# Patient Record
Sex: FEMALE | Race: WHITE | NOT HISPANIC OR LATINO | Employment: OTHER | RURAL
[De-identification: names, ages, dates, MRNs, and addresses within clinical notes are randomized per-mention and may not be internally consistent; named-entity substitution may affect disease eponyms.]

---

## 2021-02-23 ENCOUNTER — HISTORICAL (OUTPATIENT)
Dept: ADMINISTRATIVE | Facility: HOSPITAL | Age: 56
End: 2021-02-23

## 2021-04-20 ENCOUNTER — TELEPHONE (OUTPATIENT)
Dept: FAMILY MEDICINE | Facility: CLINIC | Age: 56
End: 2021-04-20

## 2021-04-20 RX ORDER — VALSARTAN 160 MG/1
160 TABLET ORAL DAILY
Qty: 90 TABLET | Refills: 0 | Status: SHIPPED | OUTPATIENT
Start: 2021-04-20 | End: 2021-07-13 | Stop reason: SDUPTHER

## 2021-04-20 RX ORDER — AMLODIPINE, VALSARTAN AND HYDROCHLOROTHIAZIDE 10; 160; 25 MG/1; MG/1; MG/1
1 TABLET ORAL DAILY
Qty: 90 TABLET | Refills: 1 | Status: CANCELLED | OUTPATIENT
Start: 2021-04-20

## 2021-04-20 RX ORDER — AMLODIPINE BESYLATE 10 MG/1
10 TABLET ORAL DAILY
COMMUNITY
End: 2021-04-20 | Stop reason: SDUPTHER

## 2021-04-20 RX ORDER — VALSARTAN 160 MG/1
160 TABLET ORAL DAILY
COMMUNITY
End: 2021-04-20 | Stop reason: SDUPTHER

## 2021-04-20 RX ORDER — HYDROCHLOROTHIAZIDE 25 MG/1
25 TABLET ORAL DAILY
Qty: 90 TABLET | Refills: 0 | Status: SHIPPED | OUTPATIENT
Start: 2021-04-20 | End: 2021-07-13 | Stop reason: SDUPTHER

## 2021-04-20 RX ORDER — HYDROCHLOROTHIAZIDE 25 MG/1
25 TABLET ORAL DAILY
COMMUNITY
End: 2021-04-20 | Stop reason: SDUPTHER

## 2021-04-20 RX ORDER — AMLODIPINE BESYLATE 10 MG/1
10 TABLET ORAL DAILY
Qty: 90 TABLET | Refills: 0 | Status: SHIPPED | OUTPATIENT
Start: 2021-04-20 | End: 2021-07-13 | Stop reason: SDUPTHER

## 2021-04-20 RX ORDER — AMLODIPINE, VALSARTAN AND HYDROCHLOROTHIAZIDE 10; 160; 25 MG/1; MG/1; MG/1
1 TABLET ORAL DAILY
COMMUNITY
Start: 2021-02-12 | End: 2021-04-20 | Stop reason: ALTCHOICE

## 2021-04-27 ENCOUNTER — OFFICE VISIT (OUTPATIENT)
Dept: FAMILY MEDICINE | Facility: CLINIC | Age: 56
End: 2021-04-27
Payer: OTHER GOVERNMENT

## 2021-04-27 VITALS
DIASTOLIC BLOOD PRESSURE: 88 MMHG | TEMPERATURE: 99 F | HEART RATE: 87 BPM | RESPIRATION RATE: 18 BRPM | SYSTOLIC BLOOD PRESSURE: 156 MMHG | HEIGHT: 63 IN | WEIGHT: 144 LBS | OXYGEN SATURATION: 99 % | BODY MASS INDEX: 25.52 KG/M2

## 2021-04-27 DIAGNOSIS — E78.5 HYPERLIPIDEMIA, UNSPECIFIED HYPERLIPIDEMIA TYPE: Primary | ICD-10-CM

## 2021-04-27 DIAGNOSIS — I10 ESSENTIAL HYPERTENSION: ICD-10-CM

## 2021-04-27 DIAGNOSIS — E87.1 HYPONATREMIA: ICD-10-CM

## 2021-04-27 DIAGNOSIS — F17.200 SMOKER: ICD-10-CM

## 2021-04-27 PROCEDURE — 99212 OFFICE O/P EST SF 10 MIN: CPT | Mod: ,,, | Performed by: NURSE PRACTITIONER

## 2021-04-27 PROCEDURE — 99212 PR OFFICE/OUTPT VISIT, EST, LEVL II, 10-19 MIN: ICD-10-PCS | Mod: ,,, | Performed by: NURSE PRACTITIONER

## 2021-07-13 ENCOUNTER — OFFICE VISIT (OUTPATIENT)
Dept: FAMILY MEDICINE | Facility: CLINIC | Age: 56
End: 2021-07-13
Payer: OTHER GOVERNMENT

## 2021-07-13 VITALS
SYSTOLIC BLOOD PRESSURE: 158 MMHG | WEIGHT: 137 LBS | BODY MASS INDEX: 24.27 KG/M2 | DIASTOLIC BLOOD PRESSURE: 78 MMHG | OXYGEN SATURATION: 97 % | HEART RATE: 81 BPM | HEIGHT: 63 IN | RESPIRATION RATE: 20 BRPM | TEMPERATURE: 98 F

## 2021-07-13 DIAGNOSIS — E78.5 DYSLIPIDEMIA: ICD-10-CM

## 2021-07-13 DIAGNOSIS — F17.200 SMOKER: ICD-10-CM

## 2021-07-13 DIAGNOSIS — I10 HYPERTENSION, UNSPECIFIED TYPE: Primary | ICD-10-CM

## 2021-07-13 DIAGNOSIS — E87.1 HYPONATREMIA: ICD-10-CM

## 2021-07-13 PROCEDURE — 99214 PR OFFICE/OUTPT VISIT, EST, LEVL IV, 30-39 MIN: ICD-10-PCS | Mod: ,,, | Performed by: NURSE PRACTITIONER

## 2021-07-13 PROCEDURE — 99214 OFFICE O/P EST MOD 30 MIN: CPT | Mod: ,,, | Performed by: NURSE PRACTITIONER

## 2021-07-13 RX ORDER — VALSARTAN 160 MG/1
160 TABLET ORAL DAILY
Qty: 90 TABLET | Refills: 1 | Status: SHIPPED | OUTPATIENT
Start: 2021-07-13 | End: 2022-01-13 | Stop reason: SDUPTHER

## 2021-07-13 RX ORDER — HYDROCHLOROTHIAZIDE 25 MG/1
25 TABLET ORAL DAILY
Qty: 90 TABLET | Refills: 1 | Status: SHIPPED | OUTPATIENT
Start: 2021-07-13 | End: 2022-01-13 | Stop reason: SDUPTHER

## 2021-07-13 RX ORDER — AMLODIPINE BESYLATE 10 MG/1
10 TABLET ORAL DAILY
Qty: 90 TABLET | Refills: 1 | Status: SHIPPED | OUTPATIENT
Start: 2021-07-13 | End: 2022-01-13 | Stop reason: SDUPTHER

## 2021-08-09 ENCOUNTER — TELEPHONE (OUTPATIENT)
Dept: FAMILY MEDICINE | Facility: CLINIC | Age: 56
End: 2021-08-09

## 2022-01-13 ENCOUNTER — OFFICE VISIT (OUTPATIENT)
Dept: FAMILY MEDICINE | Facility: CLINIC | Age: 57
End: 2022-01-13
Payer: OTHER GOVERNMENT

## 2022-01-13 VITALS
RESPIRATION RATE: 18 BRPM | HEIGHT: 63 IN | HEART RATE: 85 BPM | OXYGEN SATURATION: 97 % | WEIGHT: 135 LBS | TEMPERATURE: 98 F | BODY MASS INDEX: 23.92 KG/M2

## 2022-01-13 DIAGNOSIS — E87.1 HYPONATREMIA: ICD-10-CM

## 2022-01-13 DIAGNOSIS — R05.9 COUGH: ICD-10-CM

## 2022-01-13 DIAGNOSIS — J06.9 UPPER RESPIRATORY TRACT INFECTION, UNSPECIFIED TYPE: ICD-10-CM

## 2022-01-13 DIAGNOSIS — I10 HYPERTENSION, UNSPECIFIED TYPE: Primary | ICD-10-CM

## 2022-01-13 DIAGNOSIS — E78.5 DYSLIPIDEMIA: ICD-10-CM

## 2022-01-13 LAB
CTP QC/QA: YES
FLUAV AG NPH QL: NEGATIVE
FLUBV AG NPH QL: NEGATIVE
SARS-COV-2 AG RESP QL IA.RAPID: NEGATIVE

## 2022-01-13 PROCEDURE — 87428 POCT SARS-COV2 (COVID) WITH FLU ANTIGEN: ICD-10-PCS | Mod: QW,,, | Performed by: NURSE PRACTITIONER

## 2022-01-13 PROCEDURE — 87428 SARSCOV & INF VIR A&B AG IA: CPT | Mod: QW,,, | Performed by: NURSE PRACTITIONER

## 2022-01-13 PROCEDURE — 99213 OFFICE O/P EST LOW 20 MIN: CPT | Mod: ,,, | Performed by: NURSE PRACTITIONER

## 2022-01-13 PROCEDURE — 99213 PR OFFICE/OUTPT VISIT, EST, LEVL III, 20-29 MIN: ICD-10-PCS | Mod: ,,, | Performed by: NURSE PRACTITIONER

## 2022-01-13 RX ORDER — CEFUROXIME AXETIL 500 MG/1
500 TABLET ORAL EVERY 12 HOURS
Qty: 20 TABLET | Refills: 0 | Status: SHIPPED | OUTPATIENT
Start: 2022-01-13 | End: 2022-01-23

## 2022-01-13 RX ORDER — HYDROCHLOROTHIAZIDE 25 MG/1
25 TABLET ORAL DAILY
Qty: 90 TABLET | Refills: 1 | Status: SHIPPED | OUTPATIENT
Start: 2022-01-13 | End: 2022-06-02 | Stop reason: SDUPTHER

## 2022-01-13 RX ORDER — MUPIROCIN 20 MG/G
OINTMENT TOPICAL 3 TIMES DAILY
Qty: 15 G | Refills: 1 | Status: SHIPPED | OUTPATIENT
Start: 2022-01-13 | End: 2022-01-18

## 2022-01-13 RX ORDER — AMLODIPINE BESYLATE 10 MG/1
10 TABLET ORAL DAILY
Qty: 90 TABLET | Refills: 1 | Status: SHIPPED | OUTPATIENT
Start: 2022-01-13 | End: 2022-06-02 | Stop reason: SDUPTHER

## 2022-01-13 RX ORDER — VALSARTAN 160 MG/1
160 TABLET ORAL DAILY
Qty: 90 TABLET | Refills: 1 | Status: SHIPPED | OUTPATIENT
Start: 2022-01-13 | End: 2022-06-02 | Stop reason: SDUPTHER

## 2022-01-13 NOTE — PROGRESS NOTES
Subjective:       Patient ID: Rosetta Simental is a 57 y.o. female.    Chief Complaint: Cough, Medication Refill, Chills, Diarrhea, fever blisters (nose), and Fatigue (S/s x 1 week/)    Ms. Simental is an established patient who presents in follow up for hypertension, she reports she is getting over a recent illness with symptoms of nasal drainage and congestion, post nasal drip, dry cough, diarrhea, fever chills - she reports fever has resolved and she had multiple children visiting over the holiday. All symptoms have resolved except for copious post nasal drip and tenderness over the sinuses - she developed sores inside her nose and above her upper lip that have not improved with abreva, she thought they were fever blisters. She states she has had fever blisters on her mouth before.    Review of Systems   Constitutional: Negative for chills and fever.   HENT: Positive for nasal congestion, postnasal drip, rhinorrhea and sinus pressure/congestion. Negative for sore throat and trouble swallowing.    Respiratory: Negative for cough, chest tightness, shortness of breath and wheezing.    Cardiovascular: Negative for chest pain and palpitations.   Gastrointestinal: Positive for diarrhea. Negative for abdominal pain, change in bowel habit and change in bowel habit.   Genitourinary: Negative.    Musculoskeletal: Negative.    Integumentary:  Positive for mole/lesion (inside nose and above lips). Negative for rash.   Neurological: Negative for dizziness, weakness and headaches.   Psychiatric/Behavioral: Negative.          Objective:      Physical Exam  Vitals and nursing note reviewed.   Constitutional:       Appearance: Normal appearance.   HENT:      Head: Normocephalic.      Nose:     Cardiovascular:      Heart sounds: Normal heart sounds.   Pulmonary:      Effort: Pulmonary effort is normal.      Breath sounds: Normal breath sounds.   Abdominal:      General: Bowel sounds are normal.      Palpations: Abdomen is soft.    Musculoskeletal:         General: Normal range of motion.   Skin:     General: Skin is warm and dry.   Neurological:      Mental Status: She is alert and oriented to person, place, and time.   Psychiatric:         Behavior: Behavior normal.         Assessment:       Problem List Items Addressed This Visit    None     Visit Diagnoses     Hypertension, unspecified type    -  Primary    Relevant Medications    valsartan (DIOVAN) 160 MG tablet    hydroCHLOROthiazide (HYDRODIURIL) 25 MG tablet    amLODIPine (NORVASC) 10 MG tablet    Cough        Relevant Orders    POCT SARS-COV2 (COVID) with Flu Antigen (Completed)    Upper respiratory tract infection, unspecified type              Plan:        1. Hypertension - RF meds and continue.   2. Covid and Flu negative, start Doxycycline 100mg BID X 10 days for secondary URI, cont flonase, mupirocin for nasal sores.    3. RTC for fasting labs in 1-2 weeks. Cholesterol and CMP need repeat.

## 2022-03-07 ENCOUNTER — HOSPITAL ENCOUNTER (OUTPATIENT)
Dept: RADIOLOGY | Facility: HOSPITAL | Age: 57
Discharge: HOME | End: 2022-03-07
Payer: OTHER GOVERNMENT

## 2022-03-07 DIAGNOSIS — Z12.31 VISIT FOR SCREENING MAMMOGRAM: ICD-10-CM

## 2022-03-07 PROCEDURE — 77067 SCR MAMMO BI INCL CAD: CPT | Mod: TC

## 2022-06-02 ENCOUNTER — OFFICE VISIT (OUTPATIENT)
Dept: FAMILY MEDICINE | Facility: CLINIC | Age: 57
End: 2022-06-02
Payer: OTHER GOVERNMENT

## 2022-06-02 VITALS
HEART RATE: 79 BPM | DIASTOLIC BLOOD PRESSURE: 68 MMHG | SYSTOLIC BLOOD PRESSURE: 150 MMHG | BODY MASS INDEX: 24.27 KG/M2 | WEIGHT: 137 LBS | HEIGHT: 63 IN | RESPIRATION RATE: 17 BRPM | OXYGEN SATURATION: 99 % | TEMPERATURE: 98 F

## 2022-06-02 DIAGNOSIS — F17.200 SMOKER: ICD-10-CM

## 2022-06-02 DIAGNOSIS — E78.5 DYSLIPIDEMIA: ICD-10-CM

## 2022-06-02 DIAGNOSIS — Z12.4 SCREENING FOR MALIGNANT NEOPLASM OF CERVIX: ICD-10-CM

## 2022-06-02 DIAGNOSIS — F41.9 ANXIETY: ICD-10-CM

## 2022-06-02 DIAGNOSIS — I10 HYPERTENSION, UNSPECIFIED TYPE: Primary | ICD-10-CM

## 2022-06-02 LAB
ALBUMIN SERPL BCP-MCNC: 4.6 G/DL (ref 3.5–5)
ALBUMIN/GLOB SERPL: 1.5 {RATIO}
ALP SERPL-CCNC: 73 U/L (ref 46–118)
ALT SERPL W P-5'-P-CCNC: 58 U/L (ref 13–56)
ANION GAP SERPL CALCULATED.3IONS-SCNC: 19 MMOL/L (ref 7–16)
AST SERPL W P-5'-P-CCNC: 43 U/L (ref 15–37)
BASOPHILS # BLD AUTO: 0.06 K/UL (ref 0–0.2)
BASOPHILS NFR BLD AUTO: 0.6 % (ref 0–1)
BILIRUB SERPL-MCNC: 0.8 MG/DL (ref 0–1.2)
BUN SERPL-MCNC: 11 MG/DL (ref 7–18)
BUN/CREAT SERPL: 16 (ref 6–20)
CALCIUM SERPL-MCNC: 9.7 MG/DL (ref 8.5–10.1)
CHLORIDE SERPL-SCNC: 82 MMOL/L (ref 98–107)
CO2 SERPL-SCNC: 22 MMOL/L (ref 21–32)
CREAT SERPL-MCNC: 0.68 MG/DL (ref 0.55–1.02)
DIFFERENTIAL METHOD BLD: ABNORMAL
EOSINOPHIL # BLD AUTO: 0.02 K/UL (ref 0–0.5)
EOSINOPHIL NFR BLD AUTO: 0.2 % (ref 1–4)
ERYTHROCYTE [DISTWIDTH] IN BLOOD BY AUTOMATED COUNT: 13 % (ref 11.5–14.5)
GLOBULIN SER-MCNC: 3.1 G/DL (ref 2–4)
GLUCOSE SERPL-MCNC: 91 MG/DL (ref 74–106)
HCT VFR BLD AUTO: 34.9 % (ref 38–47)
HGB BLD-MCNC: 12.9 G/DL (ref 12–16)
IMM GRANULOCYTES # BLD AUTO: 0.06 K/UL (ref 0–0.04)
IMM GRANULOCYTES NFR BLD: 0.6 % (ref 0–0.4)
LYMPHOCYTES # BLD AUTO: 1.22 K/UL (ref 1–4.8)
LYMPHOCYTES NFR BLD AUTO: 13.1 % (ref 27–41)
MCH RBC QN AUTO: 35.7 PG (ref 27–31)
MCHC RBC AUTO-ENTMCNC: 37 G/DL (ref 32–36)
MCV RBC AUTO: 96.7 FL (ref 80–96)
MONOCYTES # BLD AUTO: 1.06 K/UL (ref 0–0.8)
MONOCYTES NFR BLD AUTO: 11.4 % (ref 2–6)
MPC BLD CALC-MCNC: 9.2 FL (ref 9.4–12.4)
NEUTROPHILS # BLD AUTO: 6.86 K/UL (ref 1.8–7.7)
NEUTROPHILS NFR BLD AUTO: 74.1 % (ref 53–65)
NRBC # BLD AUTO: 0 X10E3/UL
NRBC, AUTO (.00): 0 %
PLATELET # BLD AUTO: 368 K/UL (ref 150–400)
POTASSIUM SERPL-SCNC: 4.3 MMOL/L (ref 3.5–5.1)
PROT SERPL-MCNC: 7.7 G/DL (ref 6.4–8.2)
RBC # BLD AUTO: 3.61 M/UL (ref 4.2–5.4)
SODIUM SERPL-SCNC: 119 MMOL/L (ref 136–145)
WBC # BLD AUTO: 9.28 K/UL (ref 4.5–11)

## 2022-06-02 PROCEDURE — 99213 PR OFFICE/OUTPT VISIT, EST, LEVL III, 20-29 MIN: ICD-10-PCS | Mod: ,,, | Performed by: NURSE PRACTITIONER

## 2022-06-02 PROCEDURE — 85025 COMPLETE CBC W/AUTO DIFF WBC: CPT | Mod: ,,, | Performed by: CLINICAL MEDICAL LABORATORY

## 2022-06-02 PROCEDURE — 80053 COMPREHEN METABOLIC PANEL: CPT | Mod: ,,, | Performed by: CLINICAL MEDICAL LABORATORY

## 2022-06-02 PROCEDURE — 80053 COMPREHENSIVE METABOLIC PANEL: ICD-10-PCS | Mod: ,,, | Performed by: CLINICAL MEDICAL LABORATORY

## 2022-06-02 PROCEDURE — 85025 CBC WITH DIFFERENTIAL: ICD-10-PCS | Mod: ,,, | Performed by: CLINICAL MEDICAL LABORATORY

## 2022-06-02 PROCEDURE — 99213 OFFICE O/P EST LOW 20 MIN: CPT | Mod: ,,, | Performed by: NURSE PRACTITIONER

## 2022-06-02 RX ORDER — AMLODIPINE BESYLATE 10 MG/1
10 TABLET ORAL DAILY
Qty: 90 TABLET | Refills: 1 | Status: SHIPPED | OUTPATIENT
Start: 2022-06-02 | End: 2022-12-06 | Stop reason: SDUPTHER

## 2022-06-02 RX ORDER — VALSARTAN 160 MG/1
160 TABLET ORAL DAILY
Qty: 90 TABLET | Refills: 1 | Status: SHIPPED | OUTPATIENT
Start: 2022-06-02 | End: 2022-12-06 | Stop reason: SDUPTHER

## 2022-06-02 RX ORDER — BUSPIRONE HYDROCHLORIDE 10 MG/1
10 TABLET ORAL 3 TIMES DAILY
Qty: 90 TABLET | Refills: 1 | Status: SHIPPED | OUTPATIENT
Start: 2022-06-02 | End: 2022-12-06 | Stop reason: SDUPTHER

## 2022-06-02 RX ORDER — HYDROCHLOROTHIAZIDE 25 MG/1
25 TABLET ORAL DAILY
Qty: 90 TABLET | Refills: 1 | Status: SHIPPED | OUTPATIENT
Start: 2022-06-02 | End: 2022-12-06 | Stop reason: HOSPADM

## 2022-06-02 NOTE — PROGRESS NOTES
Subjective:       Patient ID: Rosetta Simental is a 56 y.o. female.    Chief Complaint: Medication Refill (Reports she is nonfasting), Anxiety (Gittery x 1 week, reports no pleasure in doing the things she enjoys, PHQ4 8), and Depression (PHQ9 10)    Ms. Simental presents to clinic in follow up for hypertension, blood pressure is elevated today. She reports worsening anxiety over the last few weeks due to increased personal stressors, she notes that her son in law is undergoing treatment for a malignant brain tumor.    Review of Systems   Constitutional: Negative.    Respiratory: Negative.    Cardiovascular: Negative.    Gastrointestinal: Negative.    Genitourinary: Negative.    Neurological: Negative.    Psychiatric/Behavioral: The patient is nervous/anxious.          Objective:      Physical Exam  Vitals and nursing note reviewed.   Constitutional:       Appearance: Normal appearance.   HENT:      Head: Normocephalic.   Cardiovascular:      Rate and Rhythm: Normal rate and regular rhythm.      Pulses: Normal pulses.      Heart sounds: Normal heart sounds.   Pulmonary:      Breath sounds: Wheezing (slight exp wheeze to right base) present.   Abdominal:      General: Bowel sounds are normal.      Palpations: Abdomen is soft.   Musculoskeletal:      Cervical back: Normal range of motion.   Skin:     General: Skin is warm and dry.   Neurological:      Mental Status: She is alert and oriented to person, place, and time.   Psychiatric:         Behavior: Behavior normal.         Assessment:       Problem List Items Addressed This Visit        Other    Smoker      Other Visit Diagnoses     Hypertension, unspecified type    -  Primary    Relevant Medications    valsartan (DIOVAN) 160 MG tablet    hydroCHLOROthiazide (HYDRODIURIL) 25 MG tablet    amLODIPine (NORVASC) 10 MG tablet    Other Relevant Orders    CBC Auto Differential    Comprehensive Metabolic Panel    Screening for malignant neoplasm of cervix        Relevant  Orders    Ambulatory referral/consult to Obstetrics / Gynecology    Anxiety        Dyslipidemia              Plan:        Anxiety - trial of buspar 10mg TID As needed for anxiety.   medication refills, nonfasting labs today.

## 2022-06-15 ENCOUNTER — TELEPHONE (OUTPATIENT)
Dept: FAMILY MEDICINE | Facility: CLINIC | Age: 57
End: 2022-06-15
Payer: OTHER GOVERNMENT

## 2022-06-15 NOTE — TELEPHONE ENCOUNTER
Pt attempted several times over the last week, voicemail has been left at each attempt, no answer. Karishma to be notified.

## 2022-06-17 ENCOUNTER — TELEPHONE (OUTPATIENT)
Dept: FAMILY MEDICINE | Facility: CLINIC | Age: 57
End: 2022-06-17
Payer: OTHER GOVERNMENT

## 2022-06-17 NOTE — TELEPHONE ENCOUNTER
----- Message from VAISHALI Man sent at 6/9/2022  4:49 PM CDT -----  Needs appt, reduce HCTZ to 1/2 tablet (taking 25mg) - recheck BP and lab due to low sodium level. This is urgent.

## 2022-12-06 ENCOUNTER — OFFICE VISIT (OUTPATIENT)
Dept: FAMILY MEDICINE | Facility: CLINIC | Age: 57
End: 2022-12-06
Payer: OTHER GOVERNMENT

## 2022-12-06 VITALS
RESPIRATION RATE: 18 BRPM | SYSTOLIC BLOOD PRESSURE: 138 MMHG | DIASTOLIC BLOOD PRESSURE: 80 MMHG | BODY MASS INDEX: 24.27 KG/M2 | HEIGHT: 63 IN | TEMPERATURE: 98 F | HEART RATE: 101 BPM | OXYGEN SATURATION: 96 % | WEIGHT: 137 LBS

## 2022-12-06 DIAGNOSIS — F17.200 SMOKER: ICD-10-CM

## 2022-12-06 DIAGNOSIS — E78.5 DYSLIPIDEMIA: ICD-10-CM

## 2022-12-06 DIAGNOSIS — E87.1 HYPONATREMIA: ICD-10-CM

## 2022-12-06 DIAGNOSIS — I10 PRIMARY HYPERTENSION: ICD-10-CM

## 2022-12-06 DIAGNOSIS — I10 ESSENTIAL HYPERTENSION: Primary | ICD-10-CM

## 2022-12-06 LAB
ALBUMIN SERPL BCP-MCNC: 3.9 G/DL (ref 3.5–5)
ALBUMIN/GLOB SERPL: 1 {RATIO}
ALP SERPL-CCNC: 66 U/L (ref 46–118)
ALT SERPL W P-5'-P-CCNC: 21 U/L (ref 13–56)
ANION GAP SERPL CALCULATED.3IONS-SCNC: 11 MMOL/L (ref 7–16)
AST SERPL W P-5'-P-CCNC: 19 U/L (ref 15–37)
BASOPHILS # BLD AUTO: 0.1 K/UL (ref 0–0.2)
BASOPHILS NFR BLD AUTO: 1.4 % (ref 0–1)
BILIRUB SERPL-MCNC: 0.3 MG/DL (ref ?–1.2)
BUN SERPL-MCNC: 11 MG/DL (ref 7–18)
BUN/CREAT SERPL: 14 (ref 6–20)
CALCIUM SERPL-MCNC: 9.7 MG/DL (ref 8.5–10.1)
CHLORIDE SERPL-SCNC: 97 MMOL/L (ref 98–107)
CHOLEST SERPL-MCNC: 241 MG/DL (ref 0–200)
CHOLEST/HDLC SERPL: 2.6 {RATIO}
CO2 SERPL-SCNC: 28 MMOL/L (ref 21–32)
CREAT SERPL-MCNC: 0.77 MG/DL (ref 0.55–1.02)
DIFFERENTIAL METHOD BLD: ABNORMAL
EGFR (NO RACE VARIABLE) (RUSH/TITUS): 91 ML/MIN/1.73M²
EOSINOPHIL # BLD AUTO: 0.1 K/UL (ref 0–0.5)
EOSINOPHIL NFR BLD AUTO: 1.4 % (ref 1–4)
ERYTHROCYTE [DISTWIDTH] IN BLOOD BY AUTOMATED COUNT: 13.9 % (ref 11.5–14.5)
GLOBULIN SER-MCNC: 3.9 G/DL (ref 2–4)
GLUCOSE SERPL-MCNC: 98 MG/DL (ref 74–106)
HCT VFR BLD AUTO: 37.4 % (ref 38–47)
HDLC SERPL-MCNC: 94 MG/DL (ref 40–60)
HGB BLD-MCNC: 13.2 G/DL (ref 12–16)
IMM GRANULOCYTES # BLD AUTO: 0.14 K/UL (ref 0–0.04)
IMM GRANULOCYTES NFR BLD: 2 % (ref 0–0.4)
LDLC SERPL CALC-MCNC: 122 MG/DL
LDLC/HDLC SERPL: 1.3 {RATIO}
LYMPHOCYTES # BLD AUTO: 1.73 K/UL (ref 1–4.8)
LYMPHOCYTES NFR BLD AUTO: 24.3 % (ref 27–41)
MCH RBC QN AUTO: 34.9 PG (ref 27–31)
MCHC RBC AUTO-ENTMCNC: 35.3 G/DL (ref 32–36)
MCV RBC AUTO: 98.9 FL (ref 80–96)
MONOCYTES # BLD AUTO: 0.89 K/UL (ref 0–0.8)
MONOCYTES NFR BLD AUTO: 12.5 % (ref 2–6)
MPC BLD CALC-MCNC: 8.7 FL (ref 9.4–12.4)
NEUTROPHILS # BLD AUTO: 4.16 K/UL (ref 1.8–7.7)
NEUTROPHILS NFR BLD AUTO: 58.4 % (ref 53–65)
NONHDLC SERPL-MCNC: 147 MG/DL
NRBC # BLD AUTO: 0 X10E3/UL
NRBC, AUTO (.00): 0 %
PLATELET # BLD AUTO: 487 K/UL (ref 150–400)
POTASSIUM SERPL-SCNC: 4.5 MMOL/L (ref 3.5–5.1)
PROT SERPL-MCNC: 7.8 G/DL (ref 6.4–8.2)
RBC # BLD AUTO: 3.78 M/UL (ref 4.2–5.4)
SODIUM SERPL-SCNC: 131 MMOL/L (ref 136–145)
TRIGL SERPL-MCNC: 127 MG/DL (ref 35–150)
VLDLC SERPL-MCNC: 25 MG/DL
WBC # BLD AUTO: 7.12 K/UL (ref 4.5–11)

## 2022-12-06 PROCEDURE — 85025 CBC WITH DIFFERENTIAL: ICD-10-PCS | Mod: ,,, | Performed by: CLINICAL MEDICAL LABORATORY

## 2022-12-06 PROCEDURE — 85025 COMPLETE CBC W/AUTO DIFF WBC: CPT | Mod: ,,, | Performed by: CLINICAL MEDICAL LABORATORY

## 2022-12-06 PROCEDURE — 80053 COMPREHEN METABOLIC PANEL: CPT | Mod: ,,, | Performed by: CLINICAL MEDICAL LABORATORY

## 2022-12-06 PROCEDURE — 99212 PR OFFICE/OUTPT VISIT, EST, LEVL II, 10-19 MIN: ICD-10-PCS | Mod: ,,, | Performed by: NURSE PRACTITIONER

## 2022-12-06 PROCEDURE — 80061 LIPID PANEL: CPT | Mod: ,,, | Performed by: CLINICAL MEDICAL LABORATORY

## 2022-12-06 PROCEDURE — 80061 LIPID PANEL: ICD-10-PCS | Mod: ,,, | Performed by: CLINICAL MEDICAL LABORATORY

## 2022-12-06 PROCEDURE — 80053 COMPREHENSIVE METABOLIC PANEL: ICD-10-PCS | Mod: ,,, | Performed by: CLINICAL MEDICAL LABORATORY

## 2022-12-06 PROCEDURE — 99212 OFFICE O/P EST SF 10 MIN: CPT | Mod: ,,, | Performed by: NURSE PRACTITIONER

## 2022-12-06 RX ORDER — BUSPIRONE HYDROCHLORIDE 10 MG/1
10 TABLET ORAL 3 TIMES DAILY
Qty: 90 TABLET | Refills: 1 | Status: SHIPPED | OUTPATIENT
Start: 2022-12-06 | End: 2023-06-09 | Stop reason: SDUPTHER

## 2022-12-06 RX ORDER — HYDROCHLOROTHIAZIDE 25 MG/1
25 TABLET ORAL DAILY
Qty: 90 TABLET | Refills: 1 | Status: CANCELLED | OUTPATIENT
Start: 2022-12-06

## 2022-12-06 RX ORDER — AMLODIPINE BESYLATE 10 MG/1
10 TABLET ORAL DAILY
Qty: 90 TABLET | Refills: 1 | Status: SHIPPED | OUTPATIENT
Start: 2022-12-06 | End: 2023-06-09 | Stop reason: SDUPTHER

## 2022-12-06 RX ORDER — VALSARTAN 160 MG/1
160 TABLET ORAL DAILY
Qty: 90 TABLET | Refills: 1 | Status: SHIPPED | OUTPATIENT
Start: 2022-12-06 | End: 2023-06-09 | Stop reason: SDUPTHER

## 2022-12-06 NOTE — PROGRESS NOTES
Billing Provider: VAISHALI Man  Level of Service: [place] OFFICE/OUTPT VISIT, ESTKAELA II, 10-19 MIN  Patient PCP Information       Provider PCP Type    VAISHALI Man General            Reason for Visit / Chief Complaint: Medication Refill       Update PCP  Update Chief Complaint         History of Present Illness / Problem Focused Workflow     Rosetta Simental presents to the clinic with Medication Refill     Ms. Simental presents to clinic in follow up for HTN, HLD - she is a current smoker. She has continued taking HCTZ 12.5mg QD despite hyponatremia noted on last labs. She was contacted multiple times per note and did not return calls.     Review of Systems     Review of Systems   Constitutional: Negative.    Respiratory: Negative.     Cardiovascular: Negative.    Gastrointestinal: Negative.    Genitourinary: Negative.    Musculoskeletal: Negative.    Neurological: Negative.    Psychiatric/Behavioral: Negative.        Medical / Social / Family History     Past Medical History:   Diagnosis Date    Hypertension        Past Surgical History:   Procedure Laterality Date    LEG SURGERY         Social History  Ms.  reports that she has been smoking cigarettes. She has a 15.00 pack-year smoking history. She has never used smokeless tobacco. She reports current alcohol use. She reports that she does not use drugs.    Family History  Ms.'s family history includes Guillain-Robert Lee syndrome in her mother; Heart attack in her father.    Medications and Allergies     Medications  Outpatient Medications Marked as Taking for the 12/6/22 encounter (Office Visit) with VAISHALI Man   Medication Sig Dispense Refill    [DISCONTINUED] amLODIPine (NORVASC) 10 MG tablet Take 1 tablet (10 mg total) by mouth once daily. 90 tablet 1    [DISCONTINUED] busPIRone (BUSPAR) 10 MG tablet Take 1 tablet (10 mg total) by mouth 3 (three) times daily. For anxiety 90 tablet 1    [DISCONTINUED] hydroCHLOROthiazide (HYDRODIURIL)  25 MG tablet Take 1 tablet (25 mg total) by mouth once daily. 90 tablet 1    [DISCONTINUED] valsartan (DIOVAN) 160 MG tablet Take 1 tablet (160 mg total) by mouth once daily. 90 tablet 1       Allergies  Review of patient's allergies indicates:  No Known Allergies    Physical Examination     Vitals:    12/06/22 0848   BP: 138/80   Pulse: 101   Resp: 18   Temp: 98.2 °F (36.8 °C)     Physical Exam  Vitals and nursing note reviewed.   Constitutional:       Appearance: Normal appearance.   HENT:      Head: Normocephalic.   Cardiovascular:      Rate and Rhythm: Normal rate and regular rhythm.      Pulses: Normal pulses.      Heart sounds: Normal heart sounds.   Pulmonary:      Effort: Pulmonary effort is normal.      Breath sounds: Normal breath sounds.   Abdominal:      General: Bowel sounds are normal.      Palpations: Abdomen is soft.   Musculoskeletal:         General: Normal range of motion.   Skin:     General: Skin is warm and dry.   Neurological:      Mental Status: She is alert and oriented to person, place, and time.   Psychiatric:         Behavior: Behavior normal.        Assessment and Plan (including Health Maintenance)      Problem List  Smart Sets  Document Outside HM   :    Plan:   Essential hypertension  -     Comprehensive Metabolic Panel; Future; Expected date: 12/06/2022  -     CBC Auto Differential; Future; Expected date: 12/06/2022    Primary hypertension  -     amLODIPine (NORVASC) 10 MG tablet; Take 1 tablet (10 mg total) by mouth once daily.  Dispense: 90 tablet; Refill: 1  -     valsartan (DIOVAN) 160 MG tablet; Take 1 tablet (160 mg total) by mouth once daily.  Dispense: 90 tablet; Refill: 1    Dyslipidemia  -     Lipid Panel; Future; Expected date: 12/06/2022    Smoker    Hyponatremia    Other orders  -     busPIRone (BUSPAR) 10 MG tablet; Take 1 tablet (10 mg total) by mouth 3 (three) times daily. For anxiety  Dispense: 90 tablet; Refill: 1         Health Maintenance Due   Topic Date Due     Colorectal Cancer Screening  Never done       Problem List Items Addressed This Visit          Cardiac/Vascular    Essential hypertension - Primary    Relevant Orders    Comprehensive Metabolic Panel    CBC Auto Differential       Other    Smoker     Other Visit Diagnoses       Primary hypertension        Relevant Medications    amLODIPine (NORVASC) 10 MG tablet    valsartan (DIOVAN) 160 MG tablet    Dyslipidemia        Relevant Orders    Lipid Panel    Hyponatremia                Health Maintenance Topics with due status: Not Due       Topic Last Completion Date    Lipid Panel 08/05/2021    Mammogram 03/07/2022       Future Appointments   Date Time Provider Department Center   3/13/2023  9:30 AM [facility]   6/6/2023 10:00 AM VAISHALI Man [facility] [place]        Stop HCTZ completely due to hyponatremia, monitor BP at home. May take PRN swelling in lower extremities.    Continue Buspar.    Pt. Defers colonoscopy and flu vaccine.       Signature:  VAISHALI Man      Date of encounter: 12/6/22

## 2022-12-07 ENCOUNTER — TELEPHONE (OUTPATIENT)
Dept: FAMILY MEDICINE | Facility: CLINIC | Age: 57
End: 2022-12-07
Payer: OTHER GOVERNMENT

## 2022-12-07 DIAGNOSIS — D64.9 ANEMIA, UNSPECIFIED TYPE: Primary | ICD-10-CM

## 2022-12-07 NOTE — TELEPHONE ENCOUNTER
Notified pt of results, pt voiced understanding      ----- Message from VAISHALI Man sent at 12/7/2022  8:29 AM CST -----  LDL improved but not to goal, recommend starting a low dose statin.  Sodium improved, liver enzymes normalized.  Mild macrocytic anemia, add Vit B12, folate, TSH to current lab if possible.

## 2023-06-09 DIAGNOSIS — I10 PRIMARY HYPERTENSION: ICD-10-CM

## 2023-06-11 RX ORDER — BUSPIRONE HYDROCHLORIDE 10 MG/1
10 TABLET ORAL 3 TIMES DAILY
Qty: 30 TABLET | Refills: 0 | Status: SHIPPED | OUTPATIENT
Start: 2023-06-11 | End: 2023-06-30 | Stop reason: SDUPTHER

## 2023-06-11 RX ORDER — AMLODIPINE BESYLATE 10 MG/1
10 TABLET ORAL DAILY
Qty: 30 TABLET | Refills: 0 | Status: SHIPPED | OUTPATIENT
Start: 2023-06-11 | End: 2023-06-30 | Stop reason: SDUPTHER

## 2023-06-11 RX ORDER — VALSARTAN 160 MG/1
160 TABLET ORAL DAILY
Qty: 30 TABLET | Refills: 0 | Status: SHIPPED | OUTPATIENT
Start: 2023-06-11 | End: 2023-06-30 | Stop reason: SDUPTHER

## 2023-06-30 ENCOUNTER — OFFICE VISIT (OUTPATIENT)
Dept: FAMILY MEDICINE | Facility: CLINIC | Age: 58
End: 2023-06-30
Payer: OTHER GOVERNMENT

## 2023-06-30 VITALS
DIASTOLIC BLOOD PRESSURE: 74 MMHG | BODY MASS INDEX: 23.39 KG/M2 | HEART RATE: 81 BPM | WEIGHT: 132 LBS | SYSTOLIC BLOOD PRESSURE: 154 MMHG | HEIGHT: 63 IN | OXYGEN SATURATION: 98 % | RESPIRATION RATE: 18 BRPM | TEMPERATURE: 98 F

## 2023-06-30 DIAGNOSIS — I10 ESSENTIAL HYPERTENSION: Primary | ICD-10-CM

## 2023-06-30 DIAGNOSIS — E78.5 DYSLIPIDEMIA: ICD-10-CM

## 2023-06-30 DIAGNOSIS — F41.9 ANXIETY: ICD-10-CM

## 2023-06-30 DIAGNOSIS — D64.9 ANEMIA, UNSPECIFIED TYPE: ICD-10-CM

## 2023-06-30 DIAGNOSIS — E78.5 HYPERLIPIDEMIA, UNSPECIFIED HYPERLIPIDEMIA TYPE: ICD-10-CM

## 2023-06-30 PROCEDURE — 99214 OFFICE O/P EST MOD 30 MIN: CPT | Mod: ,,, | Performed by: NURSE PRACTITIONER

## 2023-06-30 PROCEDURE — 99214 PR OFFICE/OUTPT VISIT, EST, LEVL IV, 30-39 MIN: ICD-10-PCS | Mod: ,,, | Performed by: NURSE PRACTITIONER

## 2023-06-30 RX ORDER — BUSPIRONE HYDROCHLORIDE 10 MG/1
10 TABLET ORAL 3 TIMES DAILY
Qty: 90 TABLET | Refills: 1 | Status: SHIPPED | OUTPATIENT
Start: 2023-06-30 | End: 2023-08-25

## 2023-06-30 RX ORDER — AMLODIPINE BESYLATE 10 MG/1
10 TABLET ORAL DAILY
Qty: 90 TABLET | Refills: 1 | Status: SHIPPED | OUTPATIENT
Start: 2023-06-30 | End: 2024-01-04 | Stop reason: SDUPTHER

## 2023-06-30 RX ORDER — VALSARTAN 160 MG/1
160 TABLET ORAL DAILY
Qty: 90 TABLET | Refills: 1 | Status: SHIPPED | OUTPATIENT
Start: 2023-06-30 | End: 2024-01-04 | Stop reason: SDUPTHER

## 2023-06-30 NOTE — PROGRESS NOTES
[facility]    Chief Complaint      Chief Complaint   Patient presents with    Medication Refill     All medicines. No complaints as of yet       History of Present Illness      Rosetta Simental is a 57 y.o. female. She  has a past medical history of Hypertension., who presents today for routine f/u with medication refills and lab work.  Will come back Monday for fasting labs.     Past Medical History:  Past Medical History:   Diagnosis Date    Hypertension        Past Surgical History:   has a past surgical history that includes Leg Surgery.    Social History:  Social History     Tobacco Use    Smoking status: Every Day     Packs/day: 0.75     Years: 20.00     Pack years: 15.00     Types: Cigarettes    Smokeless tobacco: Never   Substance Use Topics    Alcohol use: Yes    Drug use: Never       I personally reviewed all past medical, surgical, and social.     Review of Systems   Constitutional:  Negative for fatigue.   HENT:  Negative for ear pain and sore throat.    Eyes:  Negative for pain, discharge and visual disturbance.   Respiratory:  Negative for cough and shortness of breath.    Cardiovascular: Negative.    Gastrointestinal:  Negative for abdominal pain, constipation and diarrhea.   Genitourinary:  Negative for dysuria, menstrual problem and vaginal discharge.   Musculoskeletal:  Negative for gait problem.   Skin: Negative.    Neurological:  Negative for dizziness and light-headedness.      Medications:  Outpatient Encounter Medications as of 6/30/2023   Medication Sig Dispense Refill    [DISCONTINUED] amLODIPine (NORVASC) 10 MG tablet Take 1 tablet (10 mg total) by mouth once daily. 30 tablet 0    [DISCONTINUED] busPIRone (BUSPAR) 10 MG tablet Take 1 tablet (10 mg total) by mouth 3 (three) times daily. For anxiety 30 tablet 0    [DISCONTINUED] valsartan (DIOVAN) 160 MG tablet Take 1 tablet (160 mg total) by mouth once daily. 30 tablet 0    amLODIPine (NORVASC) 10 MG tablet Take 1 tablet (10 mg  "total) by mouth once daily. 90 tablet 1    busPIRone (BUSPAR) 10 MG tablet Take 1 tablet (10 mg total) by mouth 3 (three) times daily. For anxiety 90 tablet 1    valsartan (DIOVAN) 160 MG tablet Take 1 tablet (160 mg total) by mouth once daily. 90 tablet 1     No facility-administered encounter medications on file as of 6/30/2023.       Allergies:  Review of patient's allergies indicates:  No Known Allergies    Health Maintenance:  Immunization History   Administered Date(s) Administered    COVID-19, MRNA, LN-S, PF (MODERNA FULL 0.5 ML DOSE) 03/12/2021, 04/13/2021      Health Maintenance   Topic Date Due    Hepatitis C Screening  Never done    TETANUS VACCINE  Never done    Mammogram  03/07/2023    Lipid Panel  12/06/2027        Physical Exam      Vital Signs  Temp: 98.2 °F (36.8 °C)  Temp Source: Oral  Pulse: 81  Resp: 18  SpO2: 98 %  BP: (!) 154/74  BP Location: Right arm  Patient Position: Sitting  Pain Score: 0-No pain  Height and Weight  Height: 5' 3" (160 cm)  Weight: 59.9 kg (132 lb)  BSA (Calculated - sq m): 1.63 sq meters  BMI (Calculated): 23.4  Weight in (lb) to have BMI = 25: 140.8]    Physical Exam  Vitals and nursing note reviewed.   Constitutional:       Appearance: Normal appearance. She is well-developed.   HENT:      Head: Normocephalic.      Right Ear: Hearing normal.      Left Ear: Hearing normal.      Nose: Nose normal.   Eyes:      General: Lids are normal.      Extraocular Movements: Extraocular movements intact.      Conjunctiva/sclera: Conjunctivae normal.      Pupils: Pupils are equal, round, and reactive to light.   Cardiovascular:      Rate and Rhythm: Normal rate and regular rhythm.      Heart sounds: Normal heart sounds.   Pulmonary:      Effort: Pulmonary effort is normal.      Breath sounds: Normal breath sounds.   Musculoskeletal:         General: Normal range of motion.      Cervical back: Normal range of motion and neck supple.      Right lower leg: No edema.      Left lower leg: " No edema.   Skin:     General: Skin is warm and dry.   Neurological:      Mental Status: She is alert and oriented to person, place, and time.      Gait: Gait is intact.   Psychiatric:         Behavior: Behavior is cooperative.        Laboratory:  CBC:  Recent Labs   Lab 08/05/21  0830 06/02/22  1408 12/06/22  0943   WBC 9.84 9.28 7.12   RBC 4.25 3.61 L 3.78 L   Hemoglobin 14.9 12.9 13.2   Hematocrit 40.1 34.9 L 37.4 L   Platelet Count 418 H 368 487 H   MCV 94.4 96.7 H 98.9 H   MCH 35.1 H 35.7 H 34.9 H   MCHC 37.2 H 37.0 H 35.3     CMP:  Recent Labs   Lab 08/05/21  0830 06/02/22  1408 12/06/22  0943   Glucose 79 91 98   Calcium 9.4 9.7 9.7   Albumin 4.2 4.6 3.9   Total Protein 8.2 7.7 7.8   Sodium 127 L 119 L 131 L   Potassium 4.1 4.3 4.5   CO2 28 22 28   Chloride 94 L 82 L 97 L   BUN 8 11 11   Alk Phos 68 73 66   ALT 35 58 H 21   AST 22 43 H 19   Bilirubin, Total 0.5 0.8 0.3     LIPIDS:  Recent Labs   Lab 08/05/21  0830 12/06/22  0943   HDL Cholesterol 92 H 94 H   Cholesterol 269 H 241 H   Triglycerides 96 127   LDL Calculated 158 122   Cholesterol/HDL Ratio (Risk Factor) 2.9 2.6   Non- 147     TSH:      A1C:        Assessment/Plan     Rosetta Simental is a 57 y.o.female with:     1. Essential hypertension  - amLODIPine (NORVASC) 10 MG tablet; Take 1 tablet (10 mg total) by mouth once daily.  Dispense: 90 tablet; Refill: 1  - valsartan (DIOVAN) 160 MG tablet; Take 1 tablet (160 mg total) by mouth once daily.  Dispense: 90 tablet; Refill: 1  - CBC Auto Differential; Future  - Comprehensive Metabolic Panel; Future  - CBC Auto Differential  - Comprehensive Metabolic Panel    2. Dyslipidemia  - Lipid Panel; Future  - Lipid Panel    3. Anemia, unspecified type  - Vitamin B12 & Folate; Future  - TSH; Future  - Vitamin B12 & Folate  - TSH    4. Anxiety  - busPIRone (BUSPAR) 10 MG tablet; Take 1 tablet (10 mg total) by mouth 3 (three) times daily. For anxiety  Dispense: 90 tablet; Refill: 1    5. Hyperlipidemia,  unspecified hyperlipidemia type       Total time spent face-to-face and non-face-to-face coordinating care for this encounter was: 30 minutes     Chronic conditions status updated as per HPI.  Other than changes above, cont current medications and maintain follow up with specialists.  Return to clinic in 6 month(s).    Zee Corrigan, SUNGP  [facility]

## 2023-08-25 DIAGNOSIS — F41.9 ANXIETY: ICD-10-CM

## 2023-08-25 RX ORDER — BUSPIRONE HYDROCHLORIDE 10 MG/1
TABLET ORAL
Qty: 90 TABLET | Refills: 1 | Status: SHIPPED | OUTPATIENT
Start: 2023-08-25 | End: 2023-10-27

## 2023-10-26 DIAGNOSIS — F41.9 ANXIETY: ICD-10-CM

## 2023-10-27 RX ORDER — BUSPIRONE HYDROCHLORIDE 10 MG/1
TABLET ORAL
Qty: 90 TABLET | Refills: 1 | Status: SHIPPED | OUTPATIENT
Start: 2023-10-27 | End: 2023-12-22

## 2023-12-22 DIAGNOSIS — F41.9 ANXIETY: ICD-10-CM

## 2023-12-22 RX ORDER — BUSPIRONE HYDROCHLORIDE 10 MG/1
TABLET ORAL
Qty: 90 TABLET | Refills: 1 | Status: SHIPPED | OUTPATIENT
Start: 2023-12-22 | End: 2024-01-04 | Stop reason: SDUPTHER

## 2024-01-04 ENCOUNTER — OFFICE VISIT (OUTPATIENT)
Dept: FAMILY MEDICINE | Facility: CLINIC | Age: 59
End: 2024-01-04
Payer: OTHER GOVERNMENT

## 2024-01-04 VITALS
DIASTOLIC BLOOD PRESSURE: 60 MMHG | RESPIRATION RATE: 17 BRPM | TEMPERATURE: 98 F | BODY MASS INDEX: 23.38 KG/M2 | HEIGHT: 63 IN | SYSTOLIC BLOOD PRESSURE: 115 MMHG

## 2024-01-04 DIAGNOSIS — Z23 NEED FOR INFLUENZA VACCINATION: ICD-10-CM

## 2024-01-04 DIAGNOSIS — Z12.31 BREAST CANCER SCREENING BY MAMMOGRAM: ICD-10-CM

## 2024-01-04 DIAGNOSIS — E78.5 HYPERLIPIDEMIA, UNSPECIFIED HYPERLIPIDEMIA TYPE: ICD-10-CM

## 2024-01-04 DIAGNOSIS — F41.9 ANXIETY: ICD-10-CM

## 2024-01-04 DIAGNOSIS — Z12.4 PAP SMEAR FOR CERVICAL CANCER SCREENING: ICD-10-CM

## 2024-01-04 DIAGNOSIS — Z12.11 SCREENING FOR MALIGNANT NEOPLASM OF COLON: ICD-10-CM

## 2024-01-04 DIAGNOSIS — I10 ESSENTIAL HYPERTENSION: Primary | ICD-10-CM

## 2024-01-04 LAB
ALBUMIN SERPL BCP-MCNC: 4.1 G/DL (ref 3.5–5)
ALBUMIN/GLOB SERPL: 1.2 {RATIO}
ALP SERPL-CCNC: 69 U/L (ref 46–118)
ALT SERPL W P-5'-P-CCNC: 31 U/L (ref 13–56)
ANION GAP SERPL CALCULATED.3IONS-SCNC: 8 MMOL/L (ref 7–16)
AST SERPL W P-5'-P-CCNC: 28 U/L (ref 15–37)
BASOPHILS # BLD AUTO: 0.09 K/UL (ref 0–0.2)
BASOPHILS NFR BLD AUTO: 1.2 % (ref 0–1)
BILIRUB SERPL-MCNC: 0.3 MG/DL (ref ?–1.2)
BUN SERPL-MCNC: 8 MG/DL (ref 7–18)
BUN/CREAT SERPL: 11 (ref 6–20)
CALCIUM SERPL-MCNC: 9.7 MG/DL (ref 8.5–10.1)
CHLORIDE SERPL-SCNC: 103 MMOL/L (ref 98–107)
CHOLEST SERPL-MCNC: 251 MG/DL (ref 0–200)
CHOLEST/HDLC SERPL: 2.2 {RATIO}
CO2 SERPL-SCNC: 26 MMOL/L (ref 21–32)
CREAT SERPL-MCNC: 0.71 MG/DL (ref 0.55–1.02)
DIFFERENTIAL METHOD BLD: ABNORMAL
EGFR (NO RACE VARIABLE) (RUSH/TITUS): 99 ML/MIN/1.73M2
EOSINOPHIL # BLD AUTO: 0.11 K/UL (ref 0–0.5)
EOSINOPHIL NFR BLD AUTO: 1.5 % (ref 1–4)
ERYTHROCYTE [DISTWIDTH] IN BLOOD BY AUTOMATED COUNT: 13.4 % (ref 11.5–14.5)
GLOBULIN SER-MCNC: 3.5 G/DL (ref 2–4)
GLUCOSE SERPL-MCNC: 88 MG/DL (ref 74–106)
HCT VFR BLD AUTO: 38.8 % (ref 38–47)
HDLC SERPL-MCNC: 112 MG/DL (ref 40–60)
HGB BLD-MCNC: 14 G/DL (ref 12–16)
IMM GRANULOCYTES # BLD AUTO: 0.05 K/UL (ref 0–0.04)
IMM GRANULOCYTES NFR BLD: 0.7 % (ref 0–0.4)
LDLC SERPL CALC-MCNC: 117 MG/DL
LDLC/HDLC SERPL: 1 {RATIO}
LYMPHOCYTES # BLD AUTO: 1.53 K/UL (ref 1–4.8)
LYMPHOCYTES NFR BLD AUTO: 21 % (ref 27–41)
MCH RBC QN AUTO: 35.8 PG (ref 27–31)
MCHC RBC AUTO-ENTMCNC: 36.1 G/DL (ref 32–36)
MCV RBC AUTO: 99.2 FL (ref 80–96)
MONOCYTES # BLD AUTO: 0.85 K/UL (ref 0–0.8)
MONOCYTES NFR BLD AUTO: 11.7 % (ref 2–6)
MPC BLD CALC-MCNC: 9.4 FL (ref 9.4–12.4)
NEUTROPHILS # BLD AUTO: 4.65 K/UL (ref 1.8–7.7)
NEUTROPHILS NFR BLD AUTO: 63.9 % (ref 53–65)
NONHDLC SERPL-MCNC: 139 MG/DL
NRBC # BLD AUTO: 0 X10E3/UL
NRBC, AUTO (.00): 0 %
PLATELET # BLD AUTO: 376 K/UL (ref 150–400)
POTASSIUM SERPL-SCNC: 4.4 MMOL/L (ref 3.5–5.1)
PROT SERPL-MCNC: 7.6 G/DL (ref 6.4–8.2)
RBC # BLD AUTO: 3.91 M/UL (ref 4.2–5.4)
SODIUM SERPL-SCNC: 133 MMOL/L (ref 136–145)
TRIGL SERPL-MCNC: 111 MG/DL (ref 35–150)
VLDLC SERPL-MCNC: 22 MG/DL
WBC # BLD AUTO: 7.28 K/UL (ref 4.5–11)

## 2024-01-04 PROCEDURE — 90686 IIV4 VACC NO PRSV 0.5 ML IM: CPT | Mod: ,,, | Performed by: NURSE PRACTITIONER

## 2024-01-04 PROCEDURE — 99214 OFFICE O/P EST MOD 30 MIN: CPT | Mod: 25,,, | Performed by: NURSE PRACTITIONER

## 2024-01-04 PROCEDURE — 85025 COMPLETE CBC W/AUTO DIFF WBC: CPT | Mod: ,,, | Performed by: CLINICAL MEDICAL LABORATORY

## 2024-01-04 PROCEDURE — 90471 IMMUNIZATION ADMIN: CPT | Mod: ,,, | Performed by: NURSE PRACTITIONER

## 2024-01-04 PROCEDURE — 80053 COMPREHEN METABOLIC PANEL: CPT | Mod: ,,, | Performed by: CLINICAL MEDICAL LABORATORY

## 2024-01-04 PROCEDURE — 80061 LIPID PANEL: CPT | Mod: ,,, | Performed by: CLINICAL MEDICAL LABORATORY

## 2024-01-04 RX ORDER — BUSPIRONE HYDROCHLORIDE 10 MG/1
TABLET ORAL
Qty: 90 TABLET | Refills: 1 | Status: SHIPPED | OUTPATIENT
Start: 2024-01-04

## 2024-01-04 RX ORDER — VALSARTAN 160 MG/1
160 TABLET ORAL DAILY
Qty: 90 TABLET | Refills: 1 | Status: SHIPPED | OUTPATIENT
Start: 2024-01-04

## 2024-01-04 RX ORDER — AMLODIPINE BESYLATE 10 MG/1
10 TABLET ORAL DAILY
Qty: 90 TABLET | Refills: 1 | Status: SHIPPED | OUTPATIENT
Start: 2024-01-04

## 2024-01-04 NOTE — PROGRESS NOTES
[facility]    Chief Complaint      Chief Complaint   Patient presents with    Annual Exam     6 month wellness follow up.       History of Present Illness      Rosetta Simental is a 58 y.o. female. She  has a past medical history of Hypertension., who presents today for routine f/u and medication refills.  She will need referrals for pap exam, colon screening (wants Cologuard if possible), and mammogram as none of there are UTD per guidelines.     Past Medical History:  Past Medical History:   Diagnosis Date    Hypertension        Past Surgical History:   has a past surgical history that includes Leg Surgery.    Social History:  Social History     Tobacco Use    Smoking status: Every Day     Current packs/day: 0.75     Average packs/day: 0.8 packs/day for 20.0 years (15.0 ttl pk-yrs)     Types: Cigarettes    Smokeless tobacco: Never   Substance Use Topics    Alcohol use: Yes    Drug use: Never       I personally reviewed all past medical, surgical, and social.     Review of Systems   Constitutional:  Negative for fatigue.   HENT:  Negative for ear pain and sore throat.    Eyes:  Negative for pain, discharge and visual disturbance.   Respiratory:  Negative for cough and shortness of breath.    Cardiovascular: Negative.    Gastrointestinal:  Negative for abdominal pain, constipation and diarrhea.   Genitourinary:  Negative for dysuria, menstrual problem and vaginal discharge.   Musculoskeletal:  Negative for gait problem.   Skin: Negative.    Neurological:  Negative for dizziness and light-headedness.        Medications:  Outpatient Encounter Medications as of 1/4/2024   Medication Sig Dispense Refill    [DISCONTINUED] amLODIPine (NORVASC) 10 MG tablet Take 1 tablet (10 mg total) by mouth once daily. 90 tablet 1    [DISCONTINUED] busPIRone (BUSPAR) 10 MG tablet Take 1 tablet by mouth 3 times daily for anxiety - (MAY CAUSE DROWSINESS) 90 tablet 1    [DISCONTINUED] valsartan (DIOVAN) 160 MG tablet Take 1  "tablet (160 mg total) by mouth once daily. 90 tablet 1    amLODIPine (NORVASC) 10 MG tablet Take 1 tablet (10 mg total) by mouth once daily. 90 tablet 1    busPIRone (BUSPAR) 10 MG tablet Take 1 tablet by mouth 3 times daily for anxiety - (MAY CAUSE DROWSINESS) 90 tablet 1    valsartan (DIOVAN) 160 MG tablet Take 1 tablet (160 mg total) by mouth once daily. 90 tablet 1     No facility-administered encounter medications on file as of 1/4/2024.       Allergies:  Review of patient's allergies indicates:  No Known Allergies    Health Maintenance:  Immunization History   Administered Date(s) Administered    COVID-19, MRNA, LN-S, PF (MODERNA FULL 0.5 ML DOSE) 03/12/2021, 04/13/2021    Influenza - Quadrivalent - PF *Preferred* (6 months and older) 01/04/2024      Health Maintenance   Topic Date Due    Hepatitis C Screening  Never done    TETANUS VACCINE  Never done    Colorectal Cancer Screening  Never done    Shingles Vaccine (1 of 2) Never done    Mammogram  03/07/2023    Lipid Panel  01/04/2029        Physical Exam      Vital Signs  Temp: 97.6 °F (36.4 °C)  Temp Source: Oral  Pulse:  (could not obtain (nails))  Resp: 17  SpO2:  (could not obtain (nails))  BP: 115/60  BP Location: Left arm  Patient Position: Sitting  Height and Weight  Height: 5' 3" (160 cm)  Weight in (lb) to have BMI = 25: 140.8]    Physical Exam  Vitals and nursing note reviewed.   Constitutional:       Appearance: Normal appearance. She is well-developed.   HENT:      Head: Normocephalic.      Right Ear: Hearing normal.      Left Ear: Hearing normal.      Nose: Nose normal.   Eyes:      General: Lids are normal.      Extraocular Movements: Extraocular movements intact.      Conjunctiva/sclera: Conjunctivae normal.      Pupils: Pupils are equal, round, and reactive to light.   Cardiovascular:      Rate and Rhythm: Normal rate and regular rhythm.      Heart sounds: Normal heart sounds.   Pulmonary:      Effort: Pulmonary effort is normal.      Breath " sounds: Normal breath sounds.   Musculoskeletal:         General: Normal range of motion.      Cervical back: Normal range of motion and neck supple.      Right lower leg: No edema.      Left lower leg: No edema.   Skin:     General: Skin is warm and dry.   Neurological:      Mental Status: She is alert and oriented to person, place, and time.      Gait: Gait is intact.   Psychiatric:         Behavior: Behavior is cooperative.          Laboratory:  CBC:  Recent Labs   Lab 06/02/22  1408 12/06/22  0943 01/04/24  1008   WBC 9.28 7.12 7.28   RBC 3.61 L 3.78 L 3.91 L   Hemoglobin 12.9 13.2 14.0   Hematocrit 34.9 L 37.4 L 38.8   Platelet Count 368 487 H 376   MCV 96.7 H 98.9 H 99.2 H   MCH 35.7 H 34.9 H 35.8 H   MCHC 37.0 H 35.3 36.1 H     CMP:  Recent Labs   Lab 06/02/22  1408 12/06/22  0943 01/04/24  1008   Glucose 91 98 88   Calcium 9.7 9.7 9.7   Albumin 4.6 3.9 4.1   Total Protein 7.7 7.8 7.6   Sodium 119 L 131 L 133 L   Potassium 4.3 4.5 4.4   CO2 22 28 26   Chloride 82 L 97 L 103   BUN 11 11 8   Alk Phos 73 66 69   ALT 58 H 21 31   AST 43 H 19 28   Bilirubin, Total 0.8 0.3 0.3     LIPIDS:  Recent Labs   Lab 08/05/21  0830 12/06/22  0943 01/04/24  1008   HDL Cholesterol 92 H 94 H 112 H   Cholesterol 269 H 241 H 251 H   Triglycerides 96 127 111   LDL Calculated 158 122 117   Cholesterol/HDL Ratio (Risk Factor) 2.9 2.6 2.2   Non- 147 139     TSH:      A1C:        Assessment/Plan     Rosetta Simental is a 58 y.o.female with:     1. Essential hypertension  -     Comprehensive Metabolic Panel; Future; Expected date: 01/04/2024  -     CBC Auto Differential; Future; Expected date: 01/04/2024  -     amLODIPine (NORVASC) 10 MG tablet; Take 1 tablet (10 mg total) by mouth once daily.  Dispense: 90 tablet; Refill: 1  -     valsartan (DIOVAN) 160 MG tablet; Take 1 tablet (160 mg total) by mouth once daily.  Dispense: 90 tablet; Refill: 1    2. Hyperlipidemia, unspecified hyperlipidemia type  -     Lipid Panel; Future;  Expected date: 01/04/2024    3. Need for influenza vaccination  -     Influenza - Quadrivalent *Preferred* (6 months+) (PF)    4. Anxiety  -     busPIRone (BUSPAR) 10 MG tablet; Take 1 tablet by mouth 3 times daily for anxiety - (MAY CAUSE DROWSINESS)  Dispense: 90 tablet; Refill: 1    5. Pap smear for cervical cancer screening  -     Ambulatory referral/consult to Obstetrics / Gynecology; Future; Expected date: 01/11/2024    6. Breast cancer screening by mammogram  -     Mammo Digital Screening Bilat w/ [place]; Future; Expected date: 01/04/2024    7. Screening for malignant neoplasm of colon  -     Cologuard Screening (Multitarget Stool DNA); Future; Expected date: 01/04/2024         Total time spent face-to-face and non-face-to-face coordinating care for this encounter was: 30 minutes     Chronic conditions status updated as per HPI.  Other than changes above, cont current medications and maintain follow up with specialists.  Return to clinic in 3 month(s).    VAISHALI Ramirez  [facility]

## 2024-01-11 ENCOUNTER — TELEPHONE (OUTPATIENT)
Dept: FAMILY MEDICINE | Facility: CLINIC | Age: 59
End: 2024-01-11
Payer: OTHER GOVERNMENT

## 2024-01-11 NOTE — TELEPHONE ENCOUNTER
----- Message from VAISHALI Ramirez sent at 1/11/2024  4:58 PM CST -----  Cholesterol and LDL are still high; Sodium is still a little low but is better.  CBC is stable.

## 2024-01-11 NOTE — TELEPHONE ENCOUNTER
Pt notified that her Cholesterol and LDL are still high; Sodium is still a little low but is better.  CBC is stable. , Pt verbalized understanding.

## 2024-02-01 LAB — NONINV COLON CA DNA+OCC BLD SCRN STL QL: NEGATIVE

## 2024-06-17 ENCOUNTER — HOSPITAL ENCOUNTER (OUTPATIENT)
Dept: RADIOLOGY | Facility: HOSPITAL | Age: 59
Discharge: HOME | End: 2024-06-17
Attending: NURSE PRACTITIONER
Payer: OTHER GOVERNMENT

## 2024-06-17 VITALS — BODY MASS INDEX: 22.5 KG/M2 | HEIGHT: 63 IN | WEIGHT: 127 LBS

## 2024-06-17 DIAGNOSIS — Z12.31 VISIT FOR SCREENING MAMMOGRAM: Primary | ICD-10-CM

## 2024-06-17 DIAGNOSIS — Z12.31 BREAST CANCER SCREENING BY MAMMOGRAM: ICD-10-CM

## 2024-06-17 PROCEDURE — 77067 SCR MAMMO BI INCL CAD: CPT | Mod: TC

## 2024-07-08 ENCOUNTER — OFFICE VISIT (OUTPATIENT)
Dept: FAMILY MEDICINE | Facility: CLINIC | Age: 59
End: 2024-07-08
Payer: OTHER GOVERNMENT

## 2024-07-08 VITALS
RESPIRATION RATE: 18 BRPM | HEIGHT: 63 IN | BODY MASS INDEX: 22.61 KG/M2 | OXYGEN SATURATION: 95 % | TEMPERATURE: 98 F | WEIGHT: 127.63 LBS | SYSTOLIC BLOOD PRESSURE: 137 MMHG | HEART RATE: 93 BPM | DIASTOLIC BLOOD PRESSURE: 80 MMHG

## 2024-07-08 DIAGNOSIS — E78.5 HYPERLIPIDEMIA, UNSPECIFIED HYPERLIPIDEMIA TYPE: Primary | ICD-10-CM

## 2024-07-08 DIAGNOSIS — I10 ESSENTIAL HYPERTENSION: ICD-10-CM

## 2024-07-08 DIAGNOSIS — F41.9 ANXIETY: ICD-10-CM

## 2024-07-08 DIAGNOSIS — Z11.59 NEED FOR HEPATITIS C SCREENING TEST: ICD-10-CM

## 2024-07-08 LAB
CHOLEST SERPL-MCNC: 254 MG/DL (ref 0–200)
CHOLEST/HDLC SERPL: 1.9 {RATIO}
HDLC SERPL-MCNC: 133 MG/DL (ref 40–60)
LDLC SERPL CALC-MCNC: 108 MG/DL
NONHDLC SERPL-MCNC: 121 MG/DL
TRIGL SERPL-MCNC: 66 MG/DL (ref 35–150)
VLDLC SERPL-MCNC: 13 MG/DL

## 2024-07-08 PROCEDURE — 86803 HEPATITIS C AB TEST: CPT | Mod: ,,, | Performed by: CLINICAL MEDICAL LABORATORY

## 2024-07-08 PROCEDURE — 80061 LIPID PANEL: CPT | Mod: ,,, | Performed by: CLINICAL MEDICAL LABORATORY

## 2024-07-08 PROCEDURE — 99213 OFFICE O/P EST LOW 20 MIN: CPT | Mod: ,,, | Performed by: NURSE PRACTITIONER

## 2024-07-08 RX ORDER — VALSARTAN 160 MG/1
160 TABLET ORAL DAILY
Qty: 90 TABLET | Refills: 1 | Status: SHIPPED | OUTPATIENT
Start: 2024-07-08

## 2024-07-08 RX ORDER — BUSPIRONE HYDROCHLORIDE 10 MG/1
TABLET ORAL
Qty: 90 TABLET | Refills: 1 | Status: SHIPPED | OUTPATIENT
Start: 2024-07-08

## 2024-07-08 RX ORDER — AMLODIPINE BESYLATE 10 MG/1
10 TABLET ORAL DAILY
Qty: 90 TABLET | Refills: 1 | Status: SHIPPED | OUTPATIENT
Start: 2024-07-08

## 2024-07-08 NOTE — PROGRESS NOTES
[facility]    Chief Complaint      Chief Complaint   Patient presents with    Hyperlipidemia     X6 month, no other complaints at OV today.     care gaps      Patient defers Pneumococcal vaccine, shingles vaccine, and covid vaccine at OV today.        History of Present Illness      Rosetta Simental is a 58 y.o. female. She  has a past medical history of Hypertension., who presents today for 6 month f/u of her HTN and hyperlipidemia with lab work and medication refills. Patient has been putting off starting on statin- but her cholesterol and LDL were both elevated 6 mo ago.  We will recheck her lipid panel today.     Past Medical History:  Past Medical History:   Diagnosis Date    Hypertension        Past Surgical History:   has a past surgical history that includes Leg Surgery.    Social History:  Social History     Tobacco Use    Smoking status: Every Day     Current packs/day: 0.75     Average packs/day: 0.8 packs/day for 20.0 years (15.0 ttl pk-yrs)     Types: Cigarettes     Passive exposure: Current    Smokeless tobacco: Never   Substance Use Topics    Alcohol use: Yes    Drug use: Never       I personally reviewed all past medical, surgical, and social.     Review of Systems   Constitutional:  Negative for fatigue.   HENT:  Negative for trouble swallowing.    Eyes:  Negative for visual disturbance.   Respiratory:  Negative for cough and shortness of breath.    Cardiovascular: Negative.    Gastrointestinal:  Negative for abdominal pain, constipation and diarrhea.   Genitourinary:  Negative for difficulty urinating.   Musculoskeletal:  Negative for gait problem.   Skin: Negative.    Neurological:  Negative for dizziness, light-headedness and headaches.        Medications:  Outpatient Encounter Medications as of 7/8/2024   Medication Sig Dispense Refill    [DISCONTINUED] amLODIPine (NORVASC) 10 MG tablet Take 1 tablet (10 mg total) by mouth once daily. 90 tablet 1    [DISCONTINUED] busPIRone (BUSPAR)  "10 MG tablet Take 1 tablet by mouth 3 times daily for anxiety - (MAY CAUSE DROWSINESS) 90 tablet 1    [DISCONTINUED] valsartan (DIOVAN) 160 MG tablet Take 1 tablet (160 mg total) by mouth once daily. 90 tablet 1    amLODIPine (NORVASC) 10 MG tablet Take 1 tablet (10 mg total) by mouth once daily. 90 tablet 1    busPIRone (BUSPAR) 10 MG tablet Take 1 tablet by mouth 3 times daily for anxiety - (MAY CAUSE DROWSINESS) 90 tablet 1    valsartan (DIOVAN) 160 MG tablet Take 1 tablet (160 mg total) by mouth once daily. 90 tablet 1     No facility-administered encounter medications on file as of 7/8/2024.       Allergies:  Review of patient's allergies indicates:  No Known Allergies    Health Maintenance:  Immunization History   Administered Date(s) Administered    COVID-19, MRNA, LN-S, PF (MODERNA FULL 0.5 ML DOSE) 03/12/2021, 04/13/2021    Influenza - Quadrivalent - PF *Preferred* (6 months and older) 01/04/2024      Health Maintenance   Topic Date Due    Hepatitis C Screening  Never done    Shingles Vaccine (1 of 2) Never done    Mammogram  06/17/2025    Colorectal Cancer Screening  01/23/2027    TETANUS VACCINE  10/04/2027    Lipid Panel  01/04/2029        Physical Exam      Vital Signs  Temp: 97.9 °F (36.6 °C)  Temp Source: Oral  Pulse: 93  Resp: 18  SpO2: 95 %  BP: 137/80  BP Location: Left arm  Patient Position: Sitting  Pain Score: 0-No pain  Height and Weight  Height: 5' 3" (160 cm)  Weight: 57.9 kg (127 lb 9.6 oz)  BSA (Calculated - sq m): 1.6 sq meters  BMI (Calculated): 22.6  Weight in (lb) to have BMI = 25: 140.8]    Physical Exam  Vitals and nursing note reviewed.   Constitutional:       Appearance: Normal appearance. She is well-developed.   HENT:      Head: Normocephalic.      Right Ear: Hearing normal.      Left Ear: Hearing normal.      Nose: Nose normal.   Eyes:      General: Lids are normal.      Conjunctiva/sclera: Conjunctivae normal.   Cardiovascular:      Rate and Rhythm: Normal rate and regular " rhythm.      Heart sounds: Normal heart sounds.   Pulmonary:      Effort: Pulmonary effort is normal.      Breath sounds: Normal breath sounds.   Musculoskeletal:         General: Normal range of motion.      Cervical back: Normal range of motion and neck supple.      Right lower leg: No edema.      Left lower leg: No edema.   Skin:     General: Skin is warm and dry.   Neurological:      Mental Status: She is alert and oriented to person, place, and time.      Gait: Gait is intact.   Psychiatric:         Behavior: Behavior is cooperative.          Laboratory:  CBC:  Recent Labs   Lab 06/02/22  1408 12/06/22  0943 01/04/24  1008   WBC 9.28 7.12 7.28   RBC 3.61 L 3.78 L 3.91 L   Hemoglobin 12.9 13.2 14.0   Hematocrit 34.9 L 37.4 L 38.8   Platelet Count 368 487 H 376   MCV 96.7 H 98.9 H 99.2 H   MCH 35.7 H 34.9 H 35.8 H   MCHC 37.0 H 35.3 36.1 H     CMP:  Recent Labs   Lab 06/02/22  1408 12/06/22  0943 01/04/24  1008   Glucose 91 98 88   Calcium 9.7 9.7 9.7   Albumin 4.6 3.9 4.1   Total Protein 7.7 7.8 7.6   Sodium 119 L 131 L 133 L   Potassium 4.3 4.5 4.4   CO2 22 28 26   Chloride 82 L 97 L 103   BUN 11 11 8   Alk Phos 73 66 69   ALT 58 H 21 31   AST 43 H 19 28   Bilirubin, Total 0.8 0.3 0.3     LIPIDS:  Recent Labs   Lab 08/05/21  0830 12/06/22  0943 01/04/24  1008   HDL Cholesterol 92 H 94 H 112 H   Cholesterol 269 H 241 H 251 H   Triglycerides 96 127 111   LDL Calculated 158 122 117   Cholesterol/HDL Ratio (Risk Factor) 2.9 2.6 2.2   Non- 147 139     TSH:      A1C:        Assessment/Plan     Rosetta Simental is a 58 y.o.female with:     1. Hyperlipidemia, unspecified hyperlipidemia type  -     Lipid Panel; Future; Expected date: 07/08/2024    2. Essential hypertension  -     amLODIPine (NORVASC) 10 MG tablet; Take 1 tablet (10 mg total) by mouth once daily.  Dispense: 90 tablet; Refill: 1  -     valsartan (DIOVAN) 160 MG tablet; Take 1 tablet (160 mg total) by mouth once daily.  Dispense: 90 tablet;  Refill: 1    3. Anxiety  -     busPIRone (BUSPAR) 10 MG tablet; Take 1 tablet by mouth 3 times daily for anxiety - (MAY CAUSE DROWSINESS)  Dispense: 90 tablet; Refill: 1    4. Need for hepatitis C screening test  -     Hepatitis C Antibody; Future; Expected date: 07/08/2024         Total time spent face-to-face and non-face-to-face coordinating care for this encounter was: 20 minutes     Chronic conditions status updated as per HPI.  Other than changes above, cont current medications and maintain follow up with specialists.  Return to clinic in 6 month(s).    SUNG RamirezP  [facility]

## 2024-07-09 LAB — HCV AB SER QL: NORMAL

## 2024-07-24 ENCOUNTER — TELEPHONE (OUTPATIENT)
Dept: FAMILY MEDICINE | Facility: CLINIC | Age: 59
End: 2024-07-24
Payer: OTHER GOVERNMENT

## 2024-07-24 NOTE — TELEPHONE ENCOUNTER
----- Message from VAISHALI Ramirez sent at 7/18/2024  9:07 AM CDT -----  Patient's Chol remains high and LDL is still above 100- Recommend she start on statin therapy to reduce her CV risk.

## 2024-07-24 NOTE — TELEPHONE ENCOUNTER
Pt notified that chol remains elevated and LDL is still above 100 and that Zee recommends to start statin medication and she agreed and voiced understanding. Will notify Zee to send medication to Mr Gonzalez.

## 2025-01-02 DIAGNOSIS — F41.9 ANXIETY: ICD-10-CM

## 2025-01-08 RX ORDER — BUSPIRONE HYDROCHLORIDE 10 MG/1
TABLET ORAL
Qty: 90 TABLET | Refills: 1 | Status: SHIPPED | OUTPATIENT
Start: 2025-01-08

## 2025-01-22 ENCOUNTER — OFFICE VISIT (OUTPATIENT)
Dept: FAMILY MEDICINE | Facility: CLINIC | Age: 60
End: 2025-01-22
Payer: OTHER GOVERNMENT

## 2025-01-22 VITALS
SYSTOLIC BLOOD PRESSURE: 138 MMHG | OXYGEN SATURATION: 95 % | TEMPERATURE: 98 F | HEART RATE: 87 BPM | WEIGHT: 136 LBS | RESPIRATION RATE: 18 BRPM | BODY MASS INDEX: 24.1 KG/M2 | DIASTOLIC BLOOD PRESSURE: 76 MMHG | HEIGHT: 63 IN

## 2025-01-22 DIAGNOSIS — I10 ESSENTIAL HYPERTENSION: Primary | ICD-10-CM

## 2025-01-22 DIAGNOSIS — E78.5 HYPERLIPIDEMIA, UNSPECIFIED HYPERLIPIDEMIA TYPE: ICD-10-CM

## 2025-01-22 DIAGNOSIS — F41.9 ANXIETY: ICD-10-CM

## 2025-01-22 LAB
ALBUMIN SERPL BCP-MCNC: 4.4 G/DL (ref 3.5–5)
ALBUMIN/GLOB SERPL: 1.4 {RATIO}
ALP SERPL-CCNC: 68 U/L (ref 40–150)
ALT SERPL W P-5'-P-CCNC: 34 U/L
ANION GAP SERPL CALCULATED.3IONS-SCNC: 18 MMOL/L (ref 7–16)
AST SERPL W P-5'-P-CCNC: 55 U/L (ref 5–34)
BILIRUB SERPL-MCNC: 0.4 MG/DL
BUN SERPL-MCNC: 9 MG/DL (ref 10–20)
BUN/CREAT SERPL: 14 (ref 6–20)
CALCIUM SERPL-MCNC: 9.6 MG/DL (ref 8.4–10.2)
CHLORIDE SERPL-SCNC: 99 MMOL/L (ref 98–107)
CHOLEST SERPL-MCNC: 278 MG/DL
CHOLEST/HDLC SERPL: 1.9 {RATIO}
CO2 SERPL-SCNC: 19 MMOL/L (ref 22–29)
CREAT SERPL-MCNC: 0.66 MG/DL (ref 0.55–1.02)
EGFR (NO RACE VARIABLE) (RUSH/TITUS): 101 ML/MIN/1.73M2
GLOBULIN SER-MCNC: 3.2 G/DL (ref 2–4)
GLUCOSE SERPL-MCNC: 82 MG/DL (ref 74–100)
HDLC SERPL-MCNC: 148 MG/DL (ref 35–60)
LDLC SERPL CALC-MCNC: 119 MG/DL
NONHDLC SERPL-MCNC: 130 MG/DL
POTASSIUM SERPL-SCNC: 4.5 MMOL/L (ref 3.5–5.1)
PROT SERPL-MCNC: 7.6 G/DL (ref 6.4–8.3)
SODIUM SERPL-SCNC: 131 MMOL/L (ref 136–145)
TRIGL SERPL-MCNC: 57 MG/DL (ref 37–140)
VLDLC SERPL-MCNC: 11 MG/DL

## 2025-01-22 PROCEDURE — 80053 COMPREHEN METABOLIC PANEL: CPT | Mod: ,,, | Performed by: CLINICAL MEDICAL LABORATORY

## 2025-01-22 PROCEDURE — 99213 OFFICE O/P EST LOW 20 MIN: CPT | Mod: ,,, | Performed by: NURSE PRACTITIONER

## 2025-01-22 PROCEDURE — 80061 LIPID PANEL: CPT | Mod: ,,, | Performed by: CLINICAL MEDICAL LABORATORY

## 2025-01-22 RX ORDER — VALSARTAN 160 MG/1
160 TABLET ORAL DAILY
Qty: 90 TABLET | Refills: 1 | Status: SHIPPED | OUTPATIENT
Start: 2025-01-22

## 2025-01-22 RX ORDER — BUSPIRONE HYDROCHLORIDE 10 MG/1
TABLET ORAL
Qty: 90 TABLET | Refills: 1 | Status: SHIPPED | OUTPATIENT
Start: 2025-01-22 | End: 2025-02-03

## 2025-01-22 RX ORDER — AMLODIPINE BESYLATE 10 MG/1
10 TABLET ORAL DAILY
Qty: 90 TABLET | Refills: 1 | Status: SHIPPED | OUTPATIENT
Start: 2025-01-22

## 2025-01-22 NOTE — PROGRESS NOTES
[facility]    Chief Complaint      Chief Complaint   Patient presents with    Depression     Med refills       History of Present Illness      Rosetta Simental is a 59 y.o. female. She  has a past medical history of Hypertension., who presents today for f/u of her HTN with medication refills and fasting lab work.  She denies any complaints today.    Past Medical History:  Past Medical History:   Diagnosis Date    Hypertension        Past Surgical History:   has a past surgical history that includes Leg Surgery.    Social History:  Social History     Tobacco Use    Smoking status: Every Day     Current packs/day: 0.75     Average packs/day: 0.8 packs/day for 20.0 years (15.0 ttl pk-yrs)     Types: Cigarettes     Passive exposure: Current    Smokeless tobacco: Never   Substance Use Topics    Alcohol use: Yes    Drug use: Never       I personally reviewed all past medical, surgical, and social.     Review of Systems   Constitutional:  Negative for fatigue.   Eyes:  Negative for visual disturbance.   Respiratory:  Negative for cough and shortness of breath.    Cardiovascular: Negative.    Gastrointestinal:  Negative for abdominal pain, constipation and diarrhea.   Genitourinary:  Negative for difficulty urinating.   Musculoskeletal:  Negative for gait problem.   Skin: Negative.    Neurological:  Negative for dizziness, light-headedness and headaches.        Medications:  Outpatient Encounter Medications as of 1/22/2025   Medication Sig Dispense Refill    [DISCONTINUED] amLODIPine (NORVASC) 10 MG tablet Take 1 tablet (10 mg total) by mouth once daily. 90 tablet 1    [DISCONTINUED] busPIRone (BUSPAR) 10 MG tablet Take 1 tablet by mouth 3 times daily for anxiety - (MAY CAUSE DROWSINESS) 90 tablet 1    [DISCONTINUED] valsartan (DIOVAN) 160 MG tablet Take 1 tablet (160 mg total) by mouth once daily. 90 tablet 1    amLODIPine (NORVASC) 10 MG tablet Take 1 tablet (10 mg total) by mouth once daily. 90 tablet 1     "busPIRone (BUSPAR) 10 MG tablet Take 1 tablet by mouth 3 times daily for anxiety - (MAY CAUSE DROWSINESS) 90 tablet 1    valsartan (DIOVAN) 160 MG tablet Take 1 tablet (160 mg total) by mouth once daily. 90 tablet 1     No facility-administered encounter medications on file as of 1/22/2025.       Allergies:  Review of patient's allergies indicates:  No Known Allergies    Health Maintenance:  Immunization History   Administered Date(s) Administered    COVID-19, MRNA, LN-S, PF (MODERNA FULL 0.5 ML DOSE) 03/12/2021, 04/13/2021    Influenza - Quadrivalent - PF *Preferred* (6 months and older) 01/04/2024      Health Maintenance   Topic Date Due    Pneumococcal Vaccines (Age 50+) (1 of 2 - PCV) Never done    Shingles Vaccine (1 of 2) Never done    Cervical Cancer Screening  09/11/2016    Influenza Vaccine (1) 09/01/2024    COVID-19 Vaccine (3 - 2024-25 season) 09/01/2024    HIV Screening  06/02/2028 (Originally 12/25/1980)    Mammogram  06/17/2025    Colorectal Cancer Screening  01/23/2027    TETANUS VACCINE  10/04/2027    Lipid Panel  07/08/2029    RSV Vaccine (Age 60+ and Pregnant patients) (1 - 1-dose 75+ series) 12/25/2040    Hepatitis C Screening  Completed        Physical Exam      Vital Signs  Temp: 97.6 °F (36.4 °C)  Temp Source: Oral  Pulse: 87  Resp: 18  SpO2: 95 %  BP: 138/76  BP Location: Left arm  Patient Position: Sitting  Pain Score: 0-No pain  Height and Weight  Height: 5' 3" (160 cm)  Weight: 61.7 kg (136 lb)  BSA (Calculated - sq m): 1.66 sq meters  BMI (Calculated): 24.1  Weight in (lb) to have BMI = 25: 140.8]    Physical Exam  Vitals and nursing note reviewed.   Constitutional:       Appearance: Normal appearance. She is well-developed.   HENT:      Head: Normocephalic.      Right Ear: Hearing normal.      Left Ear: Hearing normal.      Nose: Nose normal.   Eyes:      General: Lids are normal.      Extraocular Movements: Extraocular movements intact.      Conjunctiva/sclera: Conjunctivae normal.      " Pupils: Pupils are equal, round, and reactive to light.   Cardiovascular:      Rate and Rhythm: Normal rate and regular rhythm.      Heart sounds: Normal heart sounds.   Pulmonary:      Effort: Pulmonary effort is normal.      Breath sounds: Normal breath sounds.   Musculoskeletal:         General: Normal range of motion.      Cervical back: Normal range of motion and neck supple.      Right lower leg: No edema.      Left lower leg: No edema.   Skin:     General: Skin is warm and dry.   Neurological:      Mental Status: She is alert and oriented to person, place, and time.      Gait: Gait is intact.   Psychiatric:         Behavior: Behavior is cooperative.          Laboratory:  CBC:  Recent Labs   Lab 06/02/22  1408 12/06/22  0943 01/04/24  1008   WBC 9.28 7.12 7.28   RBC 3.61 L 3.78 L 3.91 L   Hemoglobin 12.9 13.2 14.0   Hematocrit 34.9 L 37.4 L 38.8   Platelet Count 368 487 H 376   MCV 96.7 H 98.9 H 99.2 H   MCH 35.7 H 34.9 H 35.8 H   MCHC 37.0 H 35.3 36.1 H     CMP:  Recent Labs   Lab 06/02/22  1408 12/06/22  0943 01/04/24  1008   Glucose 91 98 88   Calcium 9.7 9.7 9.7   Albumin 4.6 3.9 4.1   Total Protein 7.7 7.8 7.6   Sodium 119 L 131 L 133 L   Potassium 4.3 4.5 4.4   CO2 22 28 26   Chloride 82 L 97 L 103   BUN 11 11 8   Alk Phos 73 66 69   ALT 58 H 21 31   AST 43 H 19 28   Bilirubin, Total 0.8 0.3 0.3     LIPIDS:  Recent Labs   Lab 12/06/22  0943 01/04/24  1008 07/08/24  0926   HDL Cholesterol 94 H 112 H 133 H   Cholesterol 241 H 251 H 254 H   Triglycerides 127 111 66   LDL Calculated 122 117 108   Cholesterol/HDL Ratio (Risk Factor) 2.6 2.2 1.9   Non- 139 121     TSH:      A1C:        Assessment/Plan     Rosetta Simental is a 59 y.o.female with:     1. Essential hypertension  -     Comprehensive Metabolic Panel; Future; Expected date: 01/22/2025  -     amLODIPine (NORVASC) 10 MG tablet; Take 1 tablet (10 mg total) by mouth once daily.  Dispense: 90 tablet; Refill: 1  -     valsartan (DIOVAN) 160 MG  tablet; Take 1 tablet (160 mg total) by mouth once daily.  Dispense: 90 tablet; Refill: 1    2. Hyperlipidemia, unspecified hyperlipidemia type  -     Comprehensive Metabolic Panel; Future; Expected date: 01/22/2025  -     Lipid Panel; Future; Expected date: 01/22/2025    3. Anxiety  -     busPIRone (BUSPAR) 10 MG tablet; Take 1 tablet by mouth 3 times daily for anxiety - (MAY CAUSE DROWSINESS)  Dispense: 90 tablet; Refill: 1         Total time spent face-to-face and non-face-to-face coordinating care for this encounter was: 20 minutes     Chronic conditions status updated as per HPI.  Other than changes above, cont current medications and maintain follow up with specialists.  Return to clinic in 6 month(s).    VAISHALI Ramirez  [employer] [facility] [employer]

## 2025-01-26 ENCOUNTER — HOSPITAL ENCOUNTER (INPATIENT)
Facility: HOSPITAL | Age: 60
LOS: 4 days | Discharge: HOME | DRG: 378 | End: 2025-01-30
Attending: EMERGENCY MEDICINE | Admitting: FAMILY MEDICINE
Payer: OTHER GOVERNMENT

## 2025-01-26 DIAGNOSIS — R00.0 TACHYCARDIA: ICD-10-CM

## 2025-01-26 DIAGNOSIS — D51.0 PERNICIOUS ANEMIA: ICD-10-CM

## 2025-01-26 DIAGNOSIS — D62 ACUTE BLOOD LOSS ANEMIA: ICD-10-CM

## 2025-01-26 DIAGNOSIS — E87.1 HYPONATREMIA: ICD-10-CM

## 2025-01-26 DIAGNOSIS — K29.00 ACUTE SUPERFICIAL GASTRITIS WITHOUT HEMORRHAGE: ICD-10-CM

## 2025-01-26 DIAGNOSIS — I10 ESSENTIAL HYPERTENSION: ICD-10-CM

## 2025-01-26 DIAGNOSIS — K22.70 BARRETT'S ESOPHAGUS WITHOUT DYSPLASIA: ICD-10-CM

## 2025-01-26 DIAGNOSIS — F10.288 ALCOHOL DEPENDENCE WITH OTHER ALCOHOL-INDUCED DISORDER: ICD-10-CM

## 2025-01-26 DIAGNOSIS — F10.288 ALCOHOL DEPENDENCE WITH OTHER ALCOHOL-INDUCED DISORDER: Primary | ICD-10-CM

## 2025-01-26 DIAGNOSIS — K25.9 GASTRIC ULCER, UNSPECIFIED CHRONICITY, UNSPECIFIED WHETHER GASTRIC ULCER HEMORRHAGE OR PERFORATION PRESENT: ICD-10-CM

## 2025-01-26 DIAGNOSIS — K92.2 UGIB (UPPER GASTROINTESTINAL BLEED): ICD-10-CM

## 2025-01-26 PROBLEM — F41.9 ANXIETY: Status: ACTIVE | Noted: 2025-01-26

## 2025-01-26 LAB
ALBUMIN SERPL BCP-MCNC: 3.7 G/DL (ref 3.5–5)
ALBUMIN/GLOB SERPL: 1.3 {RATIO}
ALP SERPL-CCNC: 44 U/L (ref 40–150)
ALT SERPL W P-5'-P-CCNC: 21 U/L
ANION GAP SERPL CALCULATED.3IONS-SCNC: 17 MMOL/L (ref 7–16)
AST SERPL W P-5'-P-CCNC: 27 U/L (ref 5–34)
BASOPHILS # BLD AUTO: 0.04 K/UL (ref 0–0.2)
BASOPHILS NFR BLD AUTO: 0.4 % (ref 0–1)
BILIRUB SERPL-MCNC: 0.4 MG/DL
BUN SERPL-MCNC: 50 MG/DL (ref 10–20)
BUN/CREAT SERPL: 58 (ref 6–20)
CALCIUM SERPL-MCNC: 9.7 MG/DL (ref 8.4–10.2)
CHLORIDE SERPL-SCNC: 94 MMOL/L (ref 98–107)
CO2 SERPL-SCNC: 19 MMOL/L (ref 22–29)
CREAT SERPL-MCNC: 0.86 MG/DL (ref 0.55–1.02)
DIFFERENTIAL METHOD BLD: ABNORMAL
EGFR (NO RACE VARIABLE) (RUSH/TITUS): 78 ML/MIN/1.73M2
EOSINOPHIL # BLD AUTO: 0.02 K/UL (ref 0–0.5)
EOSINOPHIL NFR BLD AUTO: 0.2 % (ref 1–4)
ERYTHROCYTE [DISTWIDTH] IN BLOOD BY AUTOMATED COUNT: 12.5 % (ref 11.5–14.5)
FERRITIN SERPL-MCNC: 740 NG/ML (ref 5–204)
FOLATE SERPL-MCNC: 7.5 NG/ML (ref 7–31.4)
GLOBULIN SER-MCNC: 2.9 G/DL (ref 2–4)
GLUCOSE SERPL-MCNC: 108 MG/DL (ref 74–100)
HCT VFR BLD AUTO: 27.2 % (ref 38–47)
HGB BLD-MCNC: 9.5 G/DL (ref 12–16)
IMM GRANULOCYTES # BLD AUTO: 0.09 K/UL (ref 0–0.04)
IMM GRANULOCYTES NFR BLD: 0.9 % (ref 0–0.4)
IRON SATN MFR SERPL: 81 % (ref 20–50)
IRON SERPL-MCNC: 183 UG/DL (ref 50–170)
LIPASE SERPL-CCNC: 39 U/L
LYMPHOCYTES # BLD AUTO: 1.66 K/UL (ref 1–4.8)
LYMPHOCYTES NFR BLD AUTO: 16.8 % (ref 27–41)
MAGNESIUM SERPL-MCNC: 1.9 MG/DL (ref 1.6–2.6)
MCH RBC QN AUTO: 34.5 PG (ref 27–31)
MCHC RBC AUTO-ENTMCNC: 34.9 G/DL (ref 32–36)
MCV RBC AUTO: 98.9 FL (ref 80–96)
MONOCYTES # BLD AUTO: 2.08 K/UL (ref 0–0.8)
MONOCYTES NFR BLD AUTO: 21.1 % (ref 2–6)
MPC BLD CALC-MCNC: 10 FL (ref 9.4–12.4)
NEUTROPHILS # BLD AUTO: 5.98 K/UL (ref 1.8–7.7)
NEUTROPHILS NFR BLD AUTO: 60.6 % (ref 53–65)
NRBC # BLD AUTO: 0 X10E3/UL
NRBC, AUTO (.00): 0 %
PLATELET # BLD AUTO: 229 K/UL (ref 150–400)
POC OCCULT BLOOD: POSITIVE
POTASSIUM SERPL-SCNC: 4.1 MMOL/L (ref 3.5–5.1)
PROT SERPL-MCNC: 6.6 G/DL (ref 6.4–8.3)
RBC # BLD AUTO: 2.75 M/UL (ref 4.2–5.4)
SODIUM SERPL-SCNC: 126 MMOL/L (ref 136–145)
TIBC SERPL-MCNC: 225 UG/DL (ref 250–450)
TIBC SERPL-MCNC: 42 UG/DL (ref 70–310)
TRANSFERRIN SERPL-MCNC: 170 MG/DL (ref 180–382)
VIT B12 SERPL-MCNC: 179 PG/ML (ref 213–816)
WBC # BLD AUTO: 9.87 K/UL (ref 4.5–11)

## 2025-01-26 PROCEDURE — 96365 THER/PROPH/DIAG IV INF INIT: CPT

## 2025-01-26 PROCEDURE — 11000001 HC ACUTE MED/SURG PRIVATE ROOM

## 2025-01-26 PROCEDURE — 25000003 PHARM REV CODE 250: Performed by: EMERGENCY MEDICINE

## 2025-01-26 PROCEDURE — 63600175 PHARM REV CODE 636 W HCPCS: Performed by: HOSPITALIST

## 2025-01-26 PROCEDURE — 99285 EMERGENCY DEPT VISIT HI MDM: CPT | Mod: 25

## 2025-01-26 PROCEDURE — 82728 ASSAY OF FERRITIN: CPT | Performed by: HOSPITALIST

## 2025-01-26 PROCEDURE — 82607 VITAMIN B-12: CPT | Performed by: HOSPITALIST

## 2025-01-26 PROCEDURE — 83690 ASSAY OF LIPASE: CPT | Performed by: EMERGENCY MEDICINE

## 2025-01-26 PROCEDURE — 82272 OCCULT BLD FECES 1-3 TESTS: CPT

## 2025-01-26 PROCEDURE — 83735 ASSAY OF MAGNESIUM: CPT | Performed by: HOSPITALIST

## 2025-01-26 PROCEDURE — 36430 TRANSFUSION BLD/BLD COMPNT: CPT

## 2025-01-26 PROCEDURE — 83540 ASSAY OF IRON: CPT | Performed by: HOSPITALIST

## 2025-01-26 PROCEDURE — 93005 ELECTROCARDIOGRAM TRACING: CPT

## 2025-01-26 PROCEDURE — 25000003 PHARM REV CODE 250: Performed by: HOSPITALIST

## 2025-01-26 PROCEDURE — 85025 COMPLETE CBC W/AUTO DIFF WBC: CPT | Performed by: EMERGENCY MEDICINE

## 2025-01-26 PROCEDURE — 82746 ASSAY OF FOLIC ACID SERUM: CPT | Performed by: HOSPITALIST

## 2025-01-26 PROCEDURE — 93010 ELECTROCARDIOGRAM REPORT: CPT | Mod: ,,, | Performed by: HOSPITALIST

## 2025-01-26 PROCEDURE — 80053 COMPREHEN METABOLIC PANEL: CPT | Performed by: EMERGENCY MEDICINE

## 2025-01-26 PROCEDURE — 63600175 PHARM REV CODE 636 W HCPCS: Performed by: EMERGENCY MEDICINE

## 2025-01-26 RX ORDER — SODIUM CHLORIDE 9 MG/ML
INJECTION, SOLUTION INTRAVENOUS CONTINUOUS
Status: DISCONTINUED | OUTPATIENT
Start: 2025-01-26 | End: 2025-01-26

## 2025-01-26 RX ORDER — ACETAMINOPHEN 500 MG
1000 TABLET ORAL EVERY 6 HOURS PRN
Status: DISCONTINUED | OUTPATIENT
Start: 2025-01-26 | End: 2025-01-30 | Stop reason: HOSPADM

## 2025-01-26 RX ORDER — PANTOPRAZOLE SODIUM 40 MG/10ML
80 INJECTION, POWDER, LYOPHILIZED, FOR SOLUTION INTRAVENOUS ONCE
Status: COMPLETED | OUTPATIENT
Start: 2025-01-26 | End: 2025-01-26

## 2025-01-26 RX ORDER — BISACODYL 5 MG
10 TABLET, DELAYED RELEASE (ENTERIC COATED) ORAL DAILY PRN
Status: DISCONTINUED | OUTPATIENT
Start: 2025-01-26 | End: 2025-01-30 | Stop reason: HOSPADM

## 2025-01-26 RX ORDER — ALUMINUM HYDROXIDE, MAGNESIUM HYDROXIDE, AND SIMETHICONE 2400; 240; 2400 MG/30ML; MG/30ML; MG/30ML
30 SUSPENSION ORAL EVERY 6 HOURS PRN
Status: DISCONTINUED | OUTPATIENT
Start: 2025-01-26 | End: 2025-01-30 | Stop reason: HOSPADM

## 2025-01-26 RX ORDER — GUAIFENESIN AND DEXTROMETHORPHAN HYDROBROMIDE 10; 100 MG/5ML; MG/5ML
10 SYRUP ORAL EVERY 6 HOURS PRN
Status: DISCONTINUED | OUTPATIENT
Start: 2025-01-26 | End: 2025-01-30 | Stop reason: HOSPADM

## 2025-01-26 RX ORDER — DOCUSATE SODIUM 100 MG/1
100 CAPSULE, LIQUID FILLED ORAL 2 TIMES DAILY PRN
Status: DISCONTINUED | OUTPATIENT
Start: 2025-01-26 | End: 2025-01-30 | Stop reason: HOSPADM

## 2025-01-26 RX ORDER — DIPHENHYDRAMINE HCL 25 MG
50 CAPSULE ORAL EVERY 6 HOURS PRN
Status: DISCONTINUED | OUTPATIENT
Start: 2025-01-26 | End: 2025-01-30 | Stop reason: HOSPADM

## 2025-01-26 RX ORDER — TALC
6 POWDER (GRAM) TOPICAL NIGHTLY PRN
Status: DISCONTINUED | OUTPATIENT
Start: 2025-01-26 | End: 2025-01-30 | Stop reason: HOSPADM

## 2025-01-26 RX ORDER — CYANOCOBALAMIN 1000 UG/ML
1000 INJECTION, SOLUTION INTRAMUSCULAR; SUBCUTANEOUS DAILY
Status: DISCONTINUED | OUTPATIENT
Start: 2025-01-27 | End: 2025-01-30 | Stop reason: HOSPADM

## 2025-01-26 RX ORDER — CLORAZEPATE DIPOTASSIUM 3.75 MG/1
7.5 TABLET ORAL EVERY 6 HOURS PRN
Status: DISCONTINUED | OUTPATIENT
Start: 2025-01-26 | End: 2025-01-30 | Stop reason: HOSPADM

## 2025-01-26 RX ORDER — TRAZODONE HYDROCHLORIDE 50 MG/1
50 TABLET ORAL NIGHTLY PRN
Status: DISCONTINUED | OUTPATIENT
Start: 2025-01-26 | End: 2025-01-30 | Stop reason: HOSPADM

## 2025-01-26 RX ORDER — PANTOPRAZOLE SODIUM 40 MG/10ML
40 INJECTION, POWDER, LYOPHILIZED, FOR SOLUTION INTRAVENOUS 2 TIMES DAILY
Status: DISCONTINUED | OUTPATIENT
Start: 2025-01-26 | End: 2025-01-26

## 2025-01-26 RX ORDER — ACETAMINOPHEN 650 MG/1
650 SUPPOSITORY RECTAL EVERY 6 HOURS PRN
Status: DISCONTINUED | OUTPATIENT
Start: 2025-01-26 | End: 2025-01-30 | Stop reason: HOSPADM

## 2025-01-26 RX ORDER — ONDANSETRON HYDROCHLORIDE 2 MG/ML
8 INJECTION, SOLUTION INTRAVENOUS EVERY 6 HOURS PRN
Status: DISCONTINUED | OUTPATIENT
Start: 2025-01-26 | End: 2025-01-30 | Stop reason: HOSPADM

## 2025-01-26 RX ORDER — BUSPIRONE HYDROCHLORIDE 5 MG/1
5 TABLET ORAL 3 TIMES DAILY
Status: DISCONTINUED | OUTPATIENT
Start: 2025-01-26 | End: 2025-01-30 | Stop reason: HOSPADM

## 2025-01-26 RX ADMIN — SODIUM CHLORIDE 8 MG/HR: 900 INJECTION INTRAVENOUS at 11:01

## 2025-01-26 RX ADMIN — PROMETHAZINE HYDROCHLORIDE 25 MG: 25 INJECTION INTRAMUSCULAR; INTRAVENOUS at 06:01

## 2025-01-26 RX ADMIN — THIAMINE HYDROCHLORIDE: 100 INJECTION, SOLUTION INTRAMUSCULAR; INTRAVENOUS at 08:01

## 2025-01-26 RX ADMIN — Medication 6 MG: at 09:01

## 2025-01-26 RX ADMIN — SODIUM CHLORIDE 1000 ML: 9 INJECTION, SOLUTION INTRAVENOUS at 06:01

## 2025-01-26 RX ADMIN — PANTOPRAZOLE SODIUM 80 MG: 40 INJECTION, POWDER, LYOPHILIZED, FOR SOLUTION INTRAVENOUS at 08:01

## 2025-01-26 RX ADMIN — SODIUM CHLORIDE 8 MG/HR: 900 INJECTION INTRAVENOUS at 08:01

## 2025-01-26 RX ADMIN — BUSPIRONE HYDROCHLORIDE 5 MG: 5 TABLET ORAL at 09:01

## 2025-01-27 ENCOUNTER — ANESTHESIA (OUTPATIENT)
Dept: GASTROENTEROLOGY | Facility: HOSPITAL | Age: 60
DRG: 378 | End: 2025-01-27
Payer: OTHER GOVERNMENT

## 2025-01-27 PROBLEM — D62 ACUTE BLOOD LOSS ANEMIA: Status: ACTIVE | Noted: 2025-01-27

## 2025-01-27 PROBLEM — K29.00 ACUTE SUPERFICIAL GASTRITIS WITHOUT HEMORRHAGE: Status: ACTIVE | Noted: 2025-01-27

## 2025-01-27 PROBLEM — K25.9 GASTRIC ULCER: Status: ACTIVE | Noted: 2025-01-27

## 2025-01-27 PROBLEM — D51.0 PERNICIOUS ANEMIA: Status: ACTIVE | Noted: 2025-01-27

## 2025-01-27 PROBLEM — K22.70 BARRETT'S ESOPHAGUS WITHOUT DYSPLASIA: Status: ACTIVE | Noted: 2025-01-27

## 2025-01-27 LAB
ABO + RH BLD: NORMAL
ABORH RETYPE: NORMAL
AMPHET UR QL SCN: NEGATIVE
APTT PPP: 30.3 SECONDS (ref 25.2–37.3)
BACTERIA #/AREA URNS HPF: ABNORMAL /HPF
BARBITURATES UR QL SCN: NEGATIVE
BENZODIAZ METAB UR QL SCN: NEGATIVE
BILIRUB UR QL STRIP: NEGATIVE
BLD PROD TYP BPU: NORMAL
BLOOD UNIT EXPIRATION DATE: NORMAL
BLOOD UNIT TYPE CODE: 9500
CANNABINOIDS UR QL SCN: NEGATIVE
CLARITY UR: CLEAR
COCAINE UR QL SCN: NEGATIVE
COLOR UR: COLORLESS
CROSSMATCH INTERPRETATION: NORMAL
DISPENSE STATUS: NORMAL
EST. AVERAGE GLUCOSE BLD GHB EST-MCNC: 91 MG/DL
GLUCOSE UR STRIP-MCNC: NORMAL MG/DL
HBA1C MFR BLD HPLC: 4.8 %
HCT VFR BLD AUTO: 19.7 % (ref 38–47)
HCT VFR BLD AUTO: 24.3 % (ref 38–47)
HCT VFR BLD AUTO: 24.9 % (ref 38–47)
HCT VFR BLD AUTO: 25.8 % (ref 38–47)
HGB BLD-MCNC: 7 G/DL (ref 12–16)
HGB BLD-MCNC: 8.1 G/DL (ref 12–16)
HGB BLD-MCNC: 8.6 G/DL (ref 12–16)
HGB BLD-MCNC: 8.6 G/DL (ref 12–16)
INDIRECT COOMBS: NORMAL
INR BLD: 1.02
KETONES UR STRIP-SCNC: NEGATIVE MG/DL
LEUKOCYTE ESTERASE UR QL STRIP: ABNORMAL
NITRITE UR QL STRIP: POSITIVE
OHS QRS DURATION: 82 MS
OHS QTC CALCULATION: 392 MS
OPIATES UR QL SCN: NEGATIVE
PCP UR QL SCN: NEGATIVE
PH UR STRIP: 6.5 PH UNITS
PROT UR QL STRIP: NEGATIVE
PROTHROMBIN TIME: 13.3 SECONDS (ref 11.7–14.7)
RBC # UR STRIP: NEGATIVE /UL
RBC #/AREA URNS HPF: <1 /HPF
RH BLD: NORMAL
SODIUM UR-SCNC: 50 MMOL/L
SP GR UR STRIP: 1.01
SPECIMEN OUTDATE: NORMAL
SQUAMOUS #/AREA URNS LPF: ABNORMAL /HPF
UNIT NUMBER: NORMAL
UROBILINOGEN UR STRIP-ACNC: NORMAL MG/DL
WBC #/AREA URNS HPF: 5 /HPF

## 2025-01-27 PROCEDURE — 86923 COMPATIBILITY TEST ELECTRIC: CPT | Performed by: HOSPITALIST

## 2025-01-27 PROCEDURE — 84300 ASSAY OF URINE SODIUM: CPT | Performed by: HOSPITALIST

## 2025-01-27 PROCEDURE — 83036 HEMOGLOBIN GLYCOSYLATED A1C: CPT | Performed by: HOSPITALIST

## 2025-01-27 PROCEDURE — P9016 RBC LEUKOCYTES REDUCED: HCPCS | Performed by: HOSPITALIST

## 2025-01-27 PROCEDURE — 30233N1 TRANSFUSION OF NONAUTOLOGOUS RED BLOOD CELLS INTO PERIPHERAL VEIN, PERCUTANEOUS APPROACH: ICD-10-PCS | Performed by: INTERNAL MEDICINE

## 2025-01-27 PROCEDURE — 0DB38ZX EXCISION OF LOWER ESOPHAGUS, VIA NATURAL OR ARTIFICIAL OPENING ENDOSCOPIC, DIAGNOSTIC: ICD-10-PCS | Performed by: INTERNAL MEDICINE

## 2025-01-27 PROCEDURE — 43239 EGD BIOPSY SINGLE/MULTIPLE: CPT | Mod: XS,,, | Performed by: INTERNAL MEDICINE

## 2025-01-27 PROCEDURE — 87077 CULTURE AEROBIC IDENTIFY: CPT | Performed by: HOSPITALIST

## 2025-01-27 PROCEDURE — 99285 EMERGENCY DEPT VISIT HI MDM: CPT | Mod: 25

## 2025-01-27 PROCEDURE — 0W3P8ZZ CONTROL BLEEDING IN GASTROINTESTINAL TRACT, VIA NATURAL OR ARTIFICIAL OPENING ENDOSCOPIC: ICD-10-PCS | Performed by: INTERNAL MEDICINE

## 2025-01-27 PROCEDURE — 25000003 PHARM REV CODE 250

## 2025-01-27 PROCEDURE — 85014 HEMATOCRIT: CPT | Performed by: HOSPITALIST

## 2025-01-27 PROCEDURE — 63600175 PHARM REV CODE 636 W HCPCS: Performed by: HOSPITALIST

## 2025-01-27 PROCEDURE — 83935 ASSAY OF URINE OSMOLALITY: CPT | Mod: 90 | Performed by: HOSPITALIST

## 2025-01-27 PROCEDURE — 43239 EGD BIOPSY SINGLE/MULTIPLE: CPT | Mod: XS | Performed by: INTERNAL MEDICINE

## 2025-01-27 PROCEDURE — 88342 IMHCHEM/IMCYTCHM 1ST ANTB: CPT | Mod: 26,,, | Performed by: PATHOLOGY

## 2025-01-27 PROCEDURE — 86900 BLOOD TYPING SEROLOGIC ABO: CPT | Performed by: HOSPITALIST

## 2025-01-27 PROCEDURE — 85610 PROTHROMBIN TIME: CPT | Performed by: HOSPITALIST

## 2025-01-27 PROCEDURE — 0DB78ZX EXCISION OF STOMACH, PYLORUS, VIA NATURAL OR ARTIFICIAL OPENING ENDOSCOPIC, DIAGNOSTIC: ICD-10-PCS | Performed by: INTERNAL MEDICINE

## 2025-01-27 PROCEDURE — 11000001 HC ACUTE MED/SURG PRIVATE ROOM

## 2025-01-27 PROCEDURE — 25000003 PHARM REV CODE 250: Performed by: HOSPITALIST

## 2025-01-27 PROCEDURE — 25000003 PHARM REV CODE 250: Performed by: INTERNAL MEDICINE

## 2025-01-27 PROCEDURE — 43270 EGD LESION ABLATION: CPT | Performed by: INTERNAL MEDICINE

## 2025-01-27 PROCEDURE — 81001 URINALYSIS AUTO W/SCOPE: CPT | Mod: XB | Performed by: HOSPITALIST

## 2025-01-27 PROCEDURE — 36415 COLL VENOUS BLD VENIPUNCTURE: CPT | Performed by: HOSPITALIST

## 2025-01-27 PROCEDURE — 99232 SBSQ HOSP IP/OBS MODERATE 35: CPT | Mod: GC,,, | Performed by: FAMILY MEDICINE

## 2025-01-27 PROCEDURE — 36415 COLL VENOUS BLD VENIPUNCTURE: CPT | Performed by: FAMILY MEDICINE

## 2025-01-27 PROCEDURE — 63600175 PHARM REV CODE 636 W HCPCS: Performed by: NURSE ANESTHETIST, CERTIFIED REGISTERED

## 2025-01-27 PROCEDURE — 85018 HEMOGLOBIN: CPT | Performed by: HOSPITALIST

## 2025-01-27 PROCEDURE — 27000284 HC CANNULA NASAL: Performed by: NURSE ANESTHETIST, CERTIFIED REGISTERED

## 2025-01-27 PROCEDURE — 80307 DRUG TEST PRSMV CHEM ANLYZR: CPT | Performed by: HOSPITALIST

## 2025-01-27 PROCEDURE — 88305 TISSUE EXAM BY PATHOLOGIST: CPT | Mod: 26,,, | Performed by: PATHOLOGY

## 2025-01-27 PROCEDURE — 88305 TISSUE EXAM BY PATHOLOGIST: CPT | Mod: TC,SUR | Performed by: INTERNAL MEDICINE

## 2025-01-27 PROCEDURE — 43270 EGD LESION ABLATION: CPT | Mod: ,,, | Performed by: INTERNAL MEDICINE

## 2025-01-27 PROCEDURE — D9220A PRA ANESTHESIA: Mod: ,,, | Performed by: NURSE ANESTHETIST, CERTIFIED REGISTERED

## 2025-01-27 PROCEDURE — 63600175 PHARM REV CODE 636 W HCPCS

## 2025-01-27 RX ORDER — HYDROCODONE BITARTRATE AND ACETAMINOPHEN 500; 5 MG/1; MG/1
TABLET ORAL
Status: DISCONTINUED | OUTPATIENT
Start: 2025-01-27 | End: 2025-01-30 | Stop reason: HOSPADM

## 2025-01-27 RX ORDER — FENTANYL CITRATE 50 UG/ML
INJECTION, SOLUTION INTRAMUSCULAR; INTRAVENOUS
Status: DISCONTINUED | OUTPATIENT
Start: 2025-01-27 | End: 2025-01-27

## 2025-01-27 RX ORDER — SODIUM CHLORIDE, SODIUM LACTATE, POTASSIUM CHLORIDE, CALCIUM CHLORIDE 600; 310; 30; 20 MG/100ML; MG/100ML; MG/100ML; MG/100ML
INJECTION, SOLUTION INTRAVENOUS CONTINUOUS
Status: DISCONTINUED | OUTPATIENT
Start: 2025-01-27 | End: 2025-01-27

## 2025-01-27 RX ORDER — LANOLIN ALCOHOL/MO/W.PET/CERES
1 CREAM (GRAM) TOPICAL DAILY
Status: DISCONTINUED | OUTPATIENT
Start: 2025-01-27 | End: 2025-01-30 | Stop reason: HOSPADM

## 2025-01-27 RX ORDER — LIDOCAINE HYDROCHLORIDE 20 MG/ML
INJECTION, SOLUTION EPIDURAL; INFILTRATION; INTRACAUDAL; PERINEURAL
Status: DISCONTINUED | OUTPATIENT
Start: 2025-01-27 | End: 2025-01-27

## 2025-01-27 RX ORDER — PROPOFOL 10 MG/ML
VIAL (ML) INTRAVENOUS
Status: DISCONTINUED | OUTPATIENT
Start: 2025-01-27 | End: 2025-01-27

## 2025-01-27 RX ORDER — SODIUM CHLORIDE 0.9 % (FLUSH) 0.9 %
10 SYRINGE (ML) INJECTION EVERY 6 HOURS PRN
Status: DISCONTINUED | OUTPATIENT
Start: 2025-01-27 | End: 2025-01-30 | Stop reason: HOSPADM

## 2025-01-27 RX ADMIN — LIDOCAINE HYDROCHLORIDE 100 MG: 20 INJECTION, SOLUTION INTRAVENOUS at 10:01

## 2025-01-27 RX ADMIN — BUSPIRONE HYDROCHLORIDE 5 MG: 5 TABLET ORAL at 02:01

## 2025-01-27 RX ADMIN — SODIUM CHLORIDE: 9 INJECTION, SOLUTION INTRAVENOUS at 02:01

## 2025-01-27 RX ADMIN — FERROUS SULFATE TAB 325 MG (65 MG ELEMENTAL FE) 1 EACH: 325 (65 FE) TAB at 02:01

## 2025-01-27 RX ADMIN — PROPOFOL 30 MG: 10 INJECTION, EMULSION INTRAVENOUS at 10:01

## 2025-01-27 RX ADMIN — PROPOFOL 50 MG: 10 INJECTION, EMULSION INTRAVENOUS at 10:01

## 2025-01-27 RX ADMIN — FENTANYL CITRATE 100 MCG: 50 INJECTION INTRAMUSCULAR; INTRAVENOUS at 10:01

## 2025-01-27 RX ADMIN — SODIUM CHLORIDE 8 MG/HR: 900 INJECTION INTRAVENOUS at 04:01

## 2025-01-27 RX ADMIN — BUSPIRONE HYDROCHLORIDE 5 MG: 5 TABLET ORAL at 09:01

## 2025-01-27 RX ADMIN — ACETAMINOPHEN 1000 MG: 500 TABLET ORAL at 07:01

## 2025-01-27 RX ADMIN — CYANOCOBALAMIN 1000 MCG: 1000 INJECTION INTRAMUSCULAR; SUBCUTANEOUS at 08:01

## 2025-01-27 RX ADMIN — Medication 6 MG: at 09:01

## 2025-01-27 RX ADMIN — SODIUM CHLORIDE 8 MG/HR: 900 INJECTION INTRAVENOUS at 08:01

## 2025-01-27 RX ADMIN — SODIUM CHLORIDE 8 MG/HR: 900 INJECTION INTRAVENOUS at 06:01

## 2025-01-27 RX ADMIN — BUSPIRONE HYDROCHLORIDE 5 MG: 5 TABLET ORAL at 08:01

## 2025-01-27 RX ADMIN — SODIUM CHLORIDE 8 MG/HR: 900 INJECTION INTRAVENOUS at 12:01

## 2025-01-27 NOTE — H&P
[facility] - Emergency Department  Hospital Medicine  History & Physical    Patient Name: Rosetta Simental  MRN: [id number]  Patient Class: IP- Inpatient  Admission Date: 1/26/2025  Attending Physician: Jer Blas Jr., MD   Primary Care Provider: Zee Corrigan FNP         Patient information was obtained from patient, spouse/SO, and ER records.     Subjective:     Principal Problem:UGIB (upper gastrointestinal bleed)    Chief Complaint:   Chief Complaint   Patient presents with    Abdominal Pain    Diarrhea    Vomiting     Pt presents to ED via POV with c/o abdominal pain, N/V/D since last night.         HPI: Patient is a 59 year old female presenting to [facility] ED with complaints of abdominal cramps, nausea, and vomiting x 3 that start lasts night after dinner. She also reports difficulty swallowing leading to gagging. Overnight her symptoms progress for which she took Pepto Bismal. From 9pm to 5am, she had recurrent bowel movements described as black and sticky, but did not notice any stephenie blood in her stool. This morning around 6am she started vomiting brownish-red liquid with small chunks for a total of three times today. Denies any prior episodes of hemoptysis, melena, or hematochezia. No history of GI disease including gastric ulcer, gastritis, esophagitis, esophageal varices, or cancer. Patient reports traveling out of the country to [place] last month. She is a current smoker and uses alcohol daily. Patient also reports daily use of aspirin 325 mg x 3 daily.      Past Medical History:   Diagnosis Date    Alcohol dependence     Essential hypertension 04/27/2021    Tobacco dependence        Past Surgical History:   Procedure Laterality Date    LEG SURGERY         Review of patient's allergies indicates:  No Known Allergies    No current facility-administered medications on file prior to encounter.     Current Outpatient Medications on File Prior to Encounter   Medication  Sig    amLODIPine (NORVASC) 10 MG tablet Take 1 tablet (10 mg total) by mouth once daily.    busPIRone (BUSPAR) 10 MG tablet Take 1 tablet by mouth 3 times daily for anxiety - (MAY CAUSE DROWSINESS)    valsartan (DIOVAN) 160 MG tablet Take 1 tablet (160 mg total) by mouth once daily.     Family History       Problem Relation (Age of Onset)    Guillain[place] syndrome Mother    Heart attack Father          Tobacco Use    Smoking status: Every Day     Current packs/day: 0.75     Average packs/day: 0.8 packs/day for 20.0 years (15.0 ttl pk-yrs)     Types: Cigarettes     Passive exposure: Current    Smokeless tobacco: Never   Substance and Sexual Activity    Alcohol use: Yes     Alcohol/week: 4.0 standard drinks of alcohol     Types: 4 Standard drinks or equivalent per week    Drug use: Never    Sexual activity: Not on file     Review of Systems   Constitutional:  Negative for activity change, appetite change, diaphoresis, fatigue, fever and unexpected weight change.   HENT:  Positive for trouble swallowing. Negative for nosebleeds and sore throat.    Eyes:  Negative for pain and visual disturbance.   Respiratory:  Negative for shortness of breath and wheezing.    Cardiovascular:  Negative for chest pain and palpitations.   Gastrointestinal:  Positive for abdominal pain, blood in stool, diarrhea, nausea and vomiting. Negative for constipation.   Endocrine: Negative for cold intolerance, polydipsia, polyphagia and polyuria.   Genitourinary:  Negative for dysuria, frequency and urgency.   Musculoskeletal:  Negative for arthralgias, back pain and neck pain.   Skin:  Negative for rash and wound.   Allergic/Immunologic: Negative for environmental allergies and food allergies.   Neurological:  Negative for syncope, light-headedness, numbness and headaches.   Hematological:  Does not bruise/bleed easily.   Psychiatric/Behavioral:  Negative for confusion. The patient is nervous/anxious.      Objective:     Vital Signs (Most  Recent):  Temp: 98.1 °F (36.7 °C) (01/26/25 1756)  Pulse: 92 (01/26/25 2041)  Resp: 18 (01/26/25 2041)  BP: 104/71 (01/26/25 2017)  SpO2: 96 % (01/26/25 2041) Vital Signs (24h Range):  Temp:  [98.1 °F (36.7 °C)] 98.1 °F (36.7 °C)  Pulse:  [] 92  Resp:  [13-21] 18  SpO2:  [95 %-98 %] 96 %  BP: ()/(51-72) 104/71     Weight: 59.6 kg (131 lb 8 oz)  Body mass index is 23.29 kg/m².     Physical Exam  Vitals and nursing note reviewed.   Constitutional:       General: She is not in acute distress.     Appearance: She is not ill-appearing.   HENT:      Right Ear: Tympanic membrane normal.      Left Ear: Tympanic membrane normal.      Nose: Nose normal.      Mouth/Throat:      Mouth: Mucous membranes are moist.      Pharynx: Oropharynx is clear.   Eyes:      Conjunctiva/sclera: Conjunctivae normal.      Pupils: Pupils are equal, round, and reactive to light.   Neck:      Vascular: No carotid bruit.   Cardiovascular:      Rate and Rhythm: Regular rhythm. Tachycardia present.      Pulses: Normal pulses.      Heart sounds: Normal heart sounds. No murmur heard.     No friction rub. No gallop.   Pulmonary:      Effort: Pulmonary effort is normal.      Breath sounds: Normal breath sounds. No wheezing, rhonchi or rales.   Abdominal:      General: Bowel sounds are normal.      Tenderness: There is abdominal tenderness in the left upper quadrant. There is no guarding or rebound.   Genitourinary:     Rectum: Guaiac result positive.   Musculoskeletal:      Cervical back: Normal range of motion and neck supple.      Right lower leg: No edema.      Left lower leg: No edema.   Skin:     General: Skin is warm and dry.      Capillary Refill: Capillary refill takes less than 2 seconds.   Neurological:      General: No focal deficit present.      Mental Status: She is oriented to person, place, and time.   Psychiatric:         Mood and Affect: Mood normal.         Behavior: Behavior normal.              CRANIAL NERVES     CN III,  IV, VI   Pupils are equal, round, and reactive to light.       Significant Labs: All pertinent labs within the past 24 hours have been reviewed.    Significant Imaging: I have reviewed all pertinent imaging results/findings within the past 24 hours.  Assessment/Plan:     * UGIB (upper gastrointestinal bleed)  Patient's hemorrhage is due to gastrointestinal bleed, patient does not have a propensity for bleeding. No prior history of GI bleed, anticoagulant used. However, the patient does take Asprin 325 x 2 approximately 1-2 times daily for pain. She reports that she trip over her dog and fell injuring her arm and face in the process. Patients most recent Hgb, Hct, platelets, and INR are listed below. Patient received a 1 L bolus of NS in the ED for low bp and tachycardia which is likely secondary to volume loss. Give Protonix 80 mg IV x 1. Rockall score 2; 5.6% risk of mortality prior to endoscopy.     Recent Labs     01/26/25  1848   HGB 9.5*   HCT 27.2*          Plan  - Will trend hemoglobin/hematocrit Every 6 hours  - Will monitor and correct any coagulation defects  - Will transfuse if Hgb is <7g/dl (<8g/dl in cases of active ACS) or if patient has rapid bleeding leading to hemodynamic instability  - Will hold aspirin due to recent bleed  - Type & screen  - PT/PTT  - Iron/TIBC/Ferritin; evaluate for iron deficiency anemia  - Protonix drip   - 0.9% NS IV at 200 ml/hr  - Will not place NGT due to lack of active bleeding and patient being clinically stable  - NPO after midnight except for sips with meds  - Supplemental 02 prn   - Consult GI; evaluate for EGD        Essential hypertension  Patient's blood pressure range in the last 24 hours was: BP  Min: 86/51  Max: 118/72.The patient's inpatient anti-hypertensive regimen is listed below:  Home Antihypertensives  Norvasc 10 mg po daily   Valsartan 160 mg po daily    Plan  - Will hold home medication due to recent GI with low BP  - Will restart after bleeding  resolves and bp rebounds     Alcohol dependence  Patient reports a six year history of daily alcohol consumption amounting to 3-5 glasses of wine. Last drink was yesterday. CIWA score 5.     Social History     Substance and Sexual Activity   Alcohol Use Yes    Alcohol/week: 4.0 standard drinks of alcohol    Types: 4 Standard drinks or equivalent per week     Plan   - Banana bag   - IVF 0.9% NS at 200 ml/hr  - UDS      Tobacco dependence  Patient has a 6 pack year history of smoking. Dangers of cigarette smoking were reviewed with patient in detail. Patient was Counseled for 3-10 minutes. Nicotine replacement options were discussed. Nicotine replacement was discussed-  will not prescribe at this time.     Anxiety  Continue home Buspar 5 mg po tid      Hyponatremia  Hyponatremia is likely due to Dehydration/hypovolemia or SIADH secondary to nausea and vomiting. The patient's most recent sodium results are listed below.  Recent Labs     01/26/25  1848   *     Plan  - Correct the sodium by 4-6mEq in 24 hours.   - Obtain the following studies: Urine sodium and urine osmolality.  - Will treat the hyponatremia with IV fluids  - Monitor sodium Daily.   - Patient hyponatremia is stable        VTE Risk Mitigation (From admission, onward)           Ordered     Place MELISSA hose  Until discontinued         01/26/25 1923     IP VTE LOW RISK PATIENT  Once         01/26/25 1923                           Patient admitted to Hospital Medicine under the direct supervision of Dr. Payne for continued work-up and medical management.          Jodi Coles MD  Department of Hospital Medicine  [facility] - Emergency Department

## 2025-01-27 NOTE — ANESTHESIA PREPROCEDURE EVALUATION
01/27/2025  Rosetta Simental is a 59 y.o., female.    Social History     Socioeconomic History    Marital status:    Tobacco Use    Smoking status: Every Day     Current packs/day: 0.75     Average packs/day: 0.8 packs/day for 20.0 years (15.0 ttl pk-yrs)     Types: Cigarettes     Passive exposure: Current    Smokeless tobacco: Never   Substance and Sexual Activity    Alcohol use: Yes     Alcohol/week: 4.0 standard drinks of alcohol     Types: 4 Standard drinks or equivalent per week    Drug use: Never      Pre-op Assessment    I have reviewed the Patient Summary Reports.     I have reviewed the Nursing Notes. I have reviewed the NPO Status.   I have reviewed the Medications.     Review of Systems  Anesthesia Hx:  No problems with previous Anesthesia                Social:  Smoker, Alcohol Use       Cardiovascular:     Hypertension                                            Past Medical History:   Diagnosis Date    Alcohol dependence     Essential hypertension 04/27/2021    Tobacco dependence      Past Surgical History:   Procedure Laterality Date    LEG SURGERY           Physical Exam  General: Well nourished, Cooperative, Alert and Oriented    Airway:  Mallampati: II     Chest/Lungs:  Clear to auscultation    Heart:  Rate: Normal        Anesthesia Plan  Type of Anesthesia, risks & benefits discussed:    Anesthesia Type: Gen Natural Airway, MAC  Intra-op Monitoring Plan: Standard ASA Monitors  Post Op Pain Control Plan: multimodal analgesia and IV/PO Opioids PRN  Induction:  IV  Informed Consent: Informed consent signed with the Patient and all parties understand the risks and agree with anesthesia plan.  All questions answered. Patient consented to blood products? Yes  ASA Score: 2  Day of Surgery Review of History & Physical: I have interviewed and examined the patient. I have reviewed the  patient's H&P dated: There are no significant changes.     Ready For Surgery From Anesthesia Perspective.     .

## 2025-01-27 NOTE — CONSULTS
[facility] - Emergency Department  Gastroenterology  Consult Note    Patient Name: Rosetta Simental  MRN: [id number]  Admission Date: 1/26/2025  Hospital Length of Stay: 1 days  Code Status: Full Code   Attending Provider: Jer Blas Jr., MD   Consulting Provider: Duy Monteiro MD  Primary Care Physician: Zee Corrigan FNP  Principal Problem:UGIB (upper gastrointestinal bleed)    Inpatient consult to Gastroenterology  Consult performed by: Duy Monteiro MD  Consult ordered by: Maryjo Payne MD  Reason for consult: GI bleed        Subjective:     HPI:  This patient is a 59-year-old female who has had melena and blood per rectum.  She has had a drop in her hemoglobin.  She is scheduled for EGD today for upper GI bleed.  Her BUN is elevated, consistent with upper GI bleed.    Past Medical History:   Diagnosis Date    Alcohol dependence     Essential hypertension 04/27/2021    Tobacco dependence        Past Surgical History:   Procedure Laterality Date    LEG SURGERY         Review of patient's allergies indicates:  No Known Allergies  Family History       Problem Relation (Age of Onset)    Guillain-[place] syndrome Mother    Heart attack Father          Tobacco Use    Smoking status: Every Day     Current packs/day: 0.75     Average packs/day: 0.8 packs/day for 20.0 years (15.0 ttl pk-yrs)     Types: Cigarettes     Passive exposure: Current    Smokeless tobacco: Never   Substance and Sexual Activity    Alcohol use: Yes     Alcohol/week: 4.0 standard drinks of alcohol     Types: 4 Standard drinks or equivalent per week    Drug use: Never    Sexual activity: Not on file     Review of Systems   Constitutional: Negative.    Respiratory: Negative.     Cardiovascular: Negative.    Gastrointestinal:  Positive for blood in stool.     Objective:     Vital Signs (Most Recent):  Temp: 97.8 °F (36.6 °C) (01/27/25 0957)  Pulse: 83 (01/27/25 0957)  Resp: 16 (01/27/25 0957)  BP: 127/72 (01/27/25  0957)  SpO2: 95 % (01/27/25 0957) Vital Signs (24h Range):  Temp:  [97.8 °F (36.6 °C)-98.7 °F (37.1 °C)] 97.8 °F (36.6 °C)  Pulse:  [] 83  Resp:  [12-21] 16  SpO2:  [94 %-98 %] 95 %  BP: ()/(51-72) 127/72     Weight: 59 kg (130 lb 1.1 oz) (01/27/25 0957)  Body mass index is 23.04 kg/m².      Intake/Output Summary (Last 24 hours) at 1/27/2025 1036  Last data filed at 1/27/2025 0923  Gross per 24 hour   Intake 2050 ml   Output --   Net 2050 ml       Lines/Drains/Airways       Peripheral Intravenous Line  Duration                  Peripheral IV - Single Lumen 01/26/25 1846 20 G Anterior;Left Forearm <1 day         Peripheral IV - Single Lumen 01/26/25 1938 20 G Anterior;Proximal;Right Forearm <1 day                    Physical Exam  Vitals reviewed.   Constitutional:       General: She is not in acute distress.     Appearance: Normal appearance. She is well-developed. She is not ill-appearing.   HENT:      Head: Normocephalic and atraumatic.      Comments: Ecchymosis is noted around right eye with a yellow color.     Nose: Nose normal.   Eyes:      Pupils: Pupils are equal, round, and reactive to light.   Cardiovascular:      Rate and Rhythm: Normal rate and regular rhythm.   Pulmonary:      Effort: Pulmonary effort is normal.      Breath sounds: Normal breath sounds. No wheezing.   Abdominal:      General: Abdomen is flat. Bowel sounds are normal. There is no distension.      Palpations: Abdomen is soft.      Tenderness: There is no abdominal tenderness. There is no guarding.   Musculoskeletal:      Comments: Right wrist is in a brace.   Skin:     General: Skin is warm and dry.      Coloration: Skin is not jaundiced.   Neurological:      Mental Status: She is alert.   Psychiatric:         Attention and Perception: Attention normal.         Mood and Affect: Affect normal.         Speech: Speech normal.         Behavior: Behavior is cooperative.      Comments: Pt was calm while speaking.         Significant  Labs:  CBC:   Recent Labs   Lab 01/26/25  1848 01/27/25  0003 01/27/25  0632   WBC 9.87  --   --    HGB 9.5* 7.0* 8.6*   HCT 27.2* 19.7* 24.9*     --   --      CMP:   Recent Labs   Lab 01/26/25  1848   *   CALCIUM 9.7   ALBUMIN 3.7   PROT 6.6   *   K 4.1   CO2 19*   CL 94*   BUN 50*   CREATININE 0.86   ALKPHOS 44   ALT 21   AST 27   BILITOT 0.4       Significant Imaging:  Imaging results within the past 24 hours have been reviewed.    Assessment/Plan:     Active Diagnoses:    Diagnosis Date Noted POA    PRINCIPAL PROBLEM:  UGIB (upper gastrointestinal bleed) [K92.2] 01/26/2025 Yes    Acute blood loss anemia [D62] 01/27/2025 Unknown    Pernicious anemia [D51.0] 01/27/2025 Unknown    Anxiety [F41.9] 01/26/2025 Yes    Hyponatremia [E87.1] 01/26/2025 Yes    Alcohol dependence [F10.20]  Yes    Tobacco dependence [F17.200]  Yes    Essential hypertension [I10] 04/27/2021 Yes      Problems Resolved During this Admission:       Impression: GI bleed  Plan: EGD today    Thank you for your consult. I will follow-up with patient. Please contact us if you have any additional questions.    Duy Monteiro MD  Gastroenterology  [facility] - Emergency Department

## 2025-01-27 NOTE — ASSESSMENT & PLAN NOTE
Patient's hemorrhage is due to gastrointestinal bleed, patient does not have a propensity for bleeding. No prior history of GI bleed, anticoagulant used. However, the patient does take Asprin 325 x 2 approximately 1-2 times daily for pain. She reports that she trip over her dog and fell injuring her arm and face in the process. Patients most recent Hgb, Hct, platelets, and INR are listed below. Patient received a 1 L bolus of NS in the ED for low bp and tachycardia which is likely secondary to volume loss. Give Protonix 80 mg IV x 1. Rockall score 2; 5.6% risk of mortality prior to endoscopy.     Recent Labs     01/26/25  1848   HGB 9.5*   HCT 27.2*          Plan  - Will trend hemoglobin/hematocrit Every 6 hours  - Will monitor and correct any coagulation defects  - Will transfuse if Hgb is <7g/dl (<8g/dl in cases of active ACS) or if patient has rapid bleeding leading to hemodynamic instability  - Will hold aspirin due to recent bleed  - Type & screen  - PT/PTT  - Iron/TIBC/Ferritin; evaluate for iron deficiency anemia  - Protonix drip   - 0.9% NS IV at 200 ml/hr  - Will not place NGT due to lack of active bleeding and patient being clinically stable  - NPO after midnight except for sips with meds  - Supplemental 02 prn   - Consult GI; evaluate for EGD    1/27  GI consulted. EGD performed. Per GI:  Molina's esophagus  3 ulcers in the stomach; the lesion was ablated with argon plasma coagulation; placed 2 clips unsuccessfully. Bleeding noted to first ulcer apc done  Performed multiple forceps biopsies in the stomach  Performed multiple forceps biopsies in the esophagus  Erythematous and ulcerated mucosa in the fundus of the stomach and antrum; performed cold forceps biopsy  Avoid nonsteroidal anti-inflammatories, continue IV PPI for 72 hours and then oral PPI for at least 8 weeks. Follow up pathology results and treat H pylori if present.

## 2025-01-27 NOTE — PHARMACY MED REC
"Admission Medication History     The home medication history was taken by Debbi Giron.    You may go to "Admission" then "Reconcile Home Medications" tabs to review and/or act upon these items.     The home medication list has been updated by the Pharmacy department.   Please read ALL comments highlighted in yellow.   Please address this information as you see fit.    Feel free to contact us if you have any questions or require assistance.  Patient mentioned she had been taking Aspirin 325 mg and Ibuprofen 200 mg but stopped yesterday.  She also takes a multivitamin, but not consistently. It's been at least a few days since she had it last.      Medications listed below were obtained from: Patient/family and [employer]  (Not in a hospital admission)        Current Outpatient Medications on File Prior to Encounter   Medication Sig Dispense Refill Last Dose/Taking    amLODIPine (NORVASC) 10 MG tablet Take 1 tablet (10 mg total) by mouth once daily. 90 tablet 1 1/26/2025 Morning    busPIRone (BUSPAR) 10 MG tablet Take 1 tablet by mouth 3 times daily for anxiety - (MAY CAUSE DROWSINESS) 90 tablet 1 1/26/2025 Morning    valsartan (DIOVAN) 160 MG tablet Take 1 tablet (160 mg total) by mouth once daily. 90 tablet 1 1/26/2025 Morning         Potential issues to be addressed PRIOR TO DISCHARGE  No issues identified.    Debbi Giron  Medication Access Specialist  EXT. [phone]    .          "

## 2025-01-27 NOTE — SUBJECTIVE & OBJECTIVE
Past Medical History:   Diagnosis Date    Alcohol dependence     Essential hypertension 04/27/2021    Tobacco dependence        Past Surgical History:   Procedure Laterality Date    LEG SURGERY         Review of patient's allergies indicates:  No Known Allergies    No current facility-administered medications on file prior to encounter.     Current Outpatient Medications on File Prior to Encounter   Medication Sig    amLODIPine (NORVASC) 10 MG tablet Take 1 tablet (10 mg total) by mouth once daily.    busPIRone (BUSPAR) 10 MG tablet Take 1 tablet by mouth 3 times daily for anxiety - (MAY CAUSE DROWSINESS)    valsartan (DIOVAN) 160 MG tablet Take 1 tablet (160 mg total) by mouth once daily.     Family History       Problem Relation (Age of Onset)    Guillain-[place] syndrome Mother    Heart attack Father          Tobacco Use    Smoking status: Every Day     Current packs/day: 0.75     Average packs/day: 0.8 packs/day for 20.0 years (15.0 ttl pk-yrs)     Types: Cigarettes     Passive exposure: Current    Smokeless tobacco: Never   Substance and Sexual Activity    Alcohol use: Yes     Alcohol/week: 4.0 standard drinks of alcohol     Types: 4 Standard drinks or equivalent per week    Drug use: Never    Sexual activity: Not on file     Review of Systems   Constitutional:  Negative for activity change, appetite change, diaphoresis, fatigue, fever and unexpected weight change.   HENT:  Positive for trouble swallowing. Negative for nosebleeds and sore throat.    Eyes:  Negative for pain and visual disturbance.   Respiratory:  Negative for shortness of breath and wheezing.    Cardiovascular:  Negative for chest pain and palpitations.   Gastrointestinal:  Positive for abdominal pain, blood in stool, diarrhea, nausea and vomiting. Negative for constipation.   Endocrine: Negative for cold intolerance, polydipsia, polyphagia and polyuria.   Genitourinary:  Negative for dysuria, frequency and urgency.   Musculoskeletal:  Negative  for arthralgias, back pain and neck pain.   Skin:  Negative for rash and wound.   Allergic/Immunologic: Negative for environmental allergies and food allergies.   Neurological:  Negative for syncope, light-headedness, numbness and headaches.   Hematological:  Does not bruise/bleed easily.   Psychiatric/Behavioral:  Negative for confusion. The patient is nervous/anxious.      Objective:     Vital Signs (Most Recent):  Temp: 98.1 °F (36.7 °C) (01/26/25 1756)  Pulse: 92 (01/26/25 2041)  Resp: 18 (01/26/25 2041)  BP: 104/71 (01/26/25 2017)  SpO2: 96 % (01/26/25 2041) Vital Signs (24h Range):  Temp:  [98.1 °F (36.7 °C)] 98.1 °F (36.7 °C)  Pulse:  [] 92  Resp:  [13-21] 18  SpO2:  [95 %-98 %] 96 %  BP: ()/(51-72) 104/71     Weight: 59.6 kg (131 lb 8 oz)  Body mass index is 23.29 kg/m².     Physical Exam  Vitals and nursing note reviewed.   Constitutional:       General: She is not in acute distress.     Appearance: She is not ill-appearing.   HENT:      Right Ear: Tympanic membrane normal.      Left Ear: Tympanic membrane normal.      Nose: Nose normal.      Mouth/Throat:      Mouth: Mucous membranes are moist.      Pharynx: Oropharynx is clear.   Eyes:      Conjunctiva/sclera: Conjunctivae normal.      Pupils: Pupils are equal, round, and reactive to light.   Neck:      Vascular: No carotid bruit.   Cardiovascular:      Rate and Rhythm: Regular rhythm. Tachycardia present.      Pulses: Normal pulses.      Heart sounds: Normal heart sounds. No murmur heard.     No friction rub. No gallop.   Pulmonary:      Effort: Pulmonary effort is normal.      Breath sounds: Normal breath sounds. No wheezing, rhonchi or rales.   Abdominal:      General: Bowel sounds are normal.      Tenderness: There is abdominal tenderness in the left upper quadrant. There is no guarding or rebound.   Genitourinary:     Rectum: Guaiac result positive.   Musculoskeletal:      Cervical back: Normal range of motion and neck supple.      Right  lower leg: No edema.      Left lower leg: No edema.   Skin:     General: Skin is warm and dry.      Capillary Refill: Capillary refill takes less than 2 seconds.   Neurological:      General: No focal deficit present.      Mental Status: She is oriented to person, place, and time.   Psychiatric:         Mood and Affect: Mood normal.         Behavior: Behavior normal.              CRANIAL NERVES     CN III, IV, VI   Pupils are equal, round, and reactive to light.       Significant Labs: All pertinent labs within the past 24 hours have been reviewed.    Significant Imaging: I have reviewed all pertinent imaging results/findings within the past 24 hours.

## 2025-01-27 NOTE — ASSESSMENT & PLAN NOTE
Patient reports a six year history of daily alcohol consumption amounting to 3-5 glasses of wine. Last drink was yesterday. CIWA score 5.     Social History     Substance and Sexual Activity   Alcohol Use Yes    Alcohol/week: 4.0 standard drinks of alcohol    Types: 4 Standard drinks or equivalent per week     Plan   - Banana bag   - IVF 0.9% NS at 200 ml/hr  - UDS

## 2025-01-27 NOTE — PROGRESS NOTES
[facility] Medicine  Progress Note    Patient Name: Rosetta Simental  MRN: [id number]  Patient Class: IP- Inpatient   Admission Date: 1/26/2025  Length of Stay: 1 days  Attending Physician: Jer Blas Jr., MD  Primary Care Provider: Zee Corrigan FNP        Subjective     Principal Problem:UGIB (upper gastrointestinal bleed)        HPI:  Patient is a 59 year old female presenting to [facility] ED with complaints of abdominal cramps, nausea, and vomiting x 3 that start lasts night after dinner. She also reports difficulty swallowing leading to gagging. Overnight her symptoms progress for which she took Pepto Bismal. From 9pm to 5am, she had recurrent bowel movements described as black and sticky, but did not notice any stephenie blood in her stool. This morning around 6am she started vomiting brownish-red liquid with small chunks for a total of three times today. Denies any prior episodes of hemoptysis, melena, or hematochezia. No history of GI disease including gastric ulcer, gastritis, esophagitis, esophageal varices, or cancer. Patient reports traveling out of the country to [place] last month. She is a current smoker and uses alcohol daily. Patient also reports daily use of aspirin 325 mg x 3 daily.      Overview/Hospital Course:    1/27   1 unit of PRBC transfused. Post transfusion H/H 8.6/25.8. Underwent EGD today. EGD notes Molina's esophagus, 3 ulcers in the stomach; tissue was ablated with argon plasma coagulation; placed 2 clips unsuccessfully. Per GI to continue iv PPI for total 72 hours. Will f/u with pathology and treat H.pylori if present. After EGD patient wanted to leave AMA.Counseled the patient about the GI recommendations and the need for iv PPI as well as her repeat H/H needed to be stable before d/c. She verbalized understanding and knows the risk of leaving AMA.     Interval History: 1 unit of PRBC transfused. Post transfusion H/H 8.6/25.8.  Underwent EGD today. EGD notesBarrett's esophagus,  3 ulcers in the stomach; tissue was ablated with argon plasma coagulation; placed 2 clips unsuccessfully. Per GI to continue iv PPI for total 72 hours. Will f/u with pathology and treat H.pylori if present. After EGD patient wanted to leave AMA.Counseled the patient about the GI recommendations and the need for iv PPI as well as her repeat H/H needed to be stable before d/c. She verbalized understanding and knows the risk of leaving AMA. Will continue to monitor       Review of Systems   Constitutional:  Negative for activity change, appetite change, diaphoresis, fatigue, fever and unexpected weight change.   HENT:  Negative for nosebleeds and sore throat.    Eyes:  Negative for pain and visual disturbance.   Respiratory:  Negative for shortness of breath and wheezing.    Cardiovascular:  Negative for chest pain and palpitations.   Gastrointestinal:  Positive for abdominal pain, blood in stool, diarrhea and vomiting. Negative for constipation.   Endocrine: Negative for cold intolerance, polydipsia, polyphagia and polyuria.   Genitourinary:  Negative for dysuria, frequency and urgency.   Musculoskeletal:  Negative for arthralgias, back pain and neck pain.   Skin:  Negative for rash and wound.   Allergic/Immunologic: Negative for environmental allergies and food allergies.   Neurological:  Negative for syncope, light-headedness, numbness and headaches.   Hematological:  Does not bruise/bleed easily.   Psychiatric/Behavioral:  Negative for confusion. The patient is nervous/anxious.      Objective:     Vital Signs (Most Recent):  Temp: 97.5 °F (36.4 °C) (01/27/25 1500)  Pulse: 77 (01/27/25 1500)  Resp: 19 (01/27/25 1500)  BP: 131/76 (01/27/25 1500)  SpO2: 100 % (01/27/25 1500) Vital Signs (24h Range):  Temp:  [97 °F (36.1 °C)-98.7 °F (37.1 °C)] 97.5 °F (36.4 °C)  Pulse:  [] 77  Resp:  [10-21] 19  SpO2:  [93 %-100 %] 100 %  BP: ()/(44-76) 131/76     Weight:  59 kg (130 lb 1.1 oz)  Body mass index is 23.04 kg/m².    Intake/Output Summary (Last 24 hours) at 1/27/2025 1532  Last data filed at 1/27/2025 1405  Gross per 24 hour   Intake 2530 ml   Output --   Net 2530 ml         Physical Exam  Vitals and nursing note reviewed.   Constitutional:       General: She is not in acute distress.     Appearance: She is not ill-appearing.   HENT:      Right Ear: Tympanic membrane normal.      Left Ear: Tympanic membrane normal.      Nose: Nose normal.      Mouth/Throat:      Mouth: Mucous membranes are moist.      Pharynx: Oropharynx is clear.   Eyes:      Conjunctiva/sclera: Conjunctivae normal.      Pupils: Pupils are equal, round, and reactive to light.   Neck:      Vascular: No carotid bruit.   Cardiovascular:      Rate and Rhythm: Regular rhythm. Tachycardia present.      Pulses: Normal pulses.      Heart sounds: Normal heart sounds. No murmur heard.     No friction rub. No gallop.   Pulmonary:      Effort: Pulmonary effort is normal.      Breath sounds: Normal breath sounds. No wheezing, rhonchi or rales.   Abdominal:      General: Bowel sounds are normal.      Tenderness: There is abdominal tenderness in the left upper quadrant. There is no guarding or rebound.   Genitourinary:     Rectum: Guaiac result positive.   Musculoskeletal:      Cervical back: Normal range of motion and neck supple.      Right lower leg: No edema.      Left lower leg: No edema.   Skin:     General: Skin is warm and dry.      Capillary Refill: Capillary refill takes less than 2 seconds.   Neurological:      General: No focal deficit present.      Mental Status: She is oriented to person, place, and time.   Psychiatric:         Mood and Affect: Mood normal.         Behavior: Behavior normal.             Significant Labs: All pertinent labs within the past 24 hours have been reviewed.    Significant Imaging: I have reviewed all pertinent imaging results/findings within the past 24 hours.    Assessment and  Plan     * UGIB (upper gastrointestinal bleed)  Patient's hemorrhage is due to gastrointestinal bleed, patient does not have a propensity for bleeding. No prior history of GI bleed, anticoagulant used. However, the patient does take Asprin 325 x 2 approximately 1-2 times daily for pain. She reports that she trip over her dog and fell injuring her arm and face in the process. Patients most recent Hgb, Hct, platelets, and INR are listed below. Patient received a 1 L bolus of NS in the ED for low bp and tachycardia which is likely secondary to volume loss. Give Protonix 80 mg IV x 1. Rockall score 2; 5.6% risk of mortality prior to endoscopy.     Recent Labs     01/26/25  1848   HGB 9.5*   HCT 27.2*          Plan  - Will trend hemoglobin/hematocrit Every 6 hours  - Will monitor and correct any coagulation defects  - Will transfuse if Hgb is <7g/dl (<8g/dl in cases of active ACS) or if patient has rapid bleeding leading to hemodynamic instability  - Will hold aspirin due to recent bleed  - Type & screen  - PT/PTT  - Iron/TIBC/Ferritin; evaluate for iron deficiency anemia  - Protonix drip   - 0.9% NS IV at 200 ml/hr  - Will not place NGT due to lack of active bleeding and patient being clinically stable  - NPO after midnight except for sips with meds  - Supplemental 02 prn   - Consult GI; evaluate for EGD    1/27  GI consulted. EGD performed. Per GI:  Molina's esophagus  3 ulcers in the stomach; the lesion was ablated with argon plasma coagulation; placed 2 clips unsuccessfully. Bleeding noted to first ulcer apc done  Performed multiple forceps biopsies in the stomach  Performed multiple forceps biopsies in the esophagus  Erythematous and ulcerated mucosa in the fundus of the stomach and antrum; performed cold forceps biopsy  Avoid nonsteroidal anti-inflammatories, continue IV PPI for 72 hours and then oral PPI for at least 8 weeks. Follow up pathology results and treat H pylori if  present.    Hyponatremia  Hyponatremia is likely due to Dehydration/hypovolemia or SIADH secondary to nausea and vomiting. The patient's most recent sodium results are listed below.  Recent Labs     01/26/25  1848   *     Plan  - Correct the sodium by 4-6mEq in 24 hours.   - Obtain the following studies: Urine sodium and urine osmolality.  - Will treat the hyponatremia with IV fluids  - Monitor sodium Daily.   - Patient hyponatremia is stable      Anxiety  Continue home Buspar 5 mg po tid      Tobacco dependence  Patient has a 6 pack year history of smoking. Dangers of cigarette smoking were reviewed with patient in detail. Patient was Counseled for 3-10 minutes. Nicotine replacement options were discussed. Nicotine replacement was discussed-  will not prescribe at this time.     Alcohol dependence  Patient reports a six year history of daily alcohol consumption amounting to 3-5 glasses of wine. Last drink was yesterday. CIWA score 5.     Social History     Substance and Sexual Activity   Alcohol Use Yes    Alcohol/week: 4.0 standard drinks of alcohol    Types: 4 Standard drinks or equivalent per week     Plan   - Banana bag   - IVF 0.9% NS at 200 ml/hr  - UDS      Essential hypertension  Patient's blood pressure range in the last 24 hours was: BP  Min: 72/47  Max: 133/70.The patient's inpatient anti-hypertensive regimen is listed below:  Home Antihypertensives  Norvasc 10 mg po daily   Valsartan 160 mg po daily    Plan  - Will hold home medication due to recent GI with low BP  - Will restart after bleeding resolves and bp rebounds           VTE Risk Mitigation (From admission, onward)           Ordered     Place MELISSA hose  Until discontinued         01/26/25 1923     IP VTE LOW RISK PATIENT  Once         01/26/25 1923                    Discharge Planning   NAYELI:      Code Status: Full Code   Medical Readiness for Discharge Date:   Discharge Plan A: Home with family                        Leonides Michael  MD  Department of Hospital Medicine   [facility]

## 2025-01-27 NOTE — ED PROVIDER NOTES
Encounter Date: 1/26/2025    SCRIBE #1 NOTE: I, Susanna Weiner, am scribing for, and in the presence of,  Frank Bruno MD. I have scribed the entire note.       History     Chief Complaint   Patient presents with    Abdominal Pain    Diarrhea    Vomiting     Pt presents to ED via POV with c/o abdominal pain, N/V/D since last night.      This is a 60 y/o female,who presents to the ED with complaints of abdominal cramping which started last night. She notes N/V/D as well. She notes she has vomited 3-4 times since her sx started. She notes she has taken Pepto Bismal which has possibly now turned her stools dark black and sticky appearing but there has not been any blood in her stools. She reports she was in [place] last month. She denies any ABX recently. Her  notes he has been coughing but no similar sx. There are no other complaints/pain in the ED at this time. She ha a known hx of HTN. She is a current every day smoker.  Patient reports vomitus is a dark brown color.  Patient states that she drinks 3 or 4 drinks of alcohol a day and takes 3 or 4 aspirin daily.    The history is provided by the patient and the spouse. No  was used.     Review of patient's allergies indicates:  No Known Allergies  Past Medical History:   Diagnosis Date    Alcohol dependence     Essential hypertension 04/27/2021    Tobacco dependence      Past Surgical History:   Procedure Laterality Date    LEG SURGERY       Family History   Problem Relation Name Age of Onset    Guillain-Napa syndrome Mother      Heart attack Father       Social History     Tobacco Use    Smoking status: Every Day     Current packs/day: 0.75     Average packs/day: 0.8 packs/day for 20.0 years (15.0 ttl pk-yrs)     Types: Cigarettes     Passive exposure: Current    Smokeless tobacco: Never   Substance Use Topics    Alcohol use: Yes     Alcohol/week: 4.0 standard drinks of alcohol     Types: 4 Standard drinks or equivalent per week     Drug use: Never     Review of Systems   Gastrointestinal:  Positive for abdominal pain, diarrhea, nausea and vomiting. Negative for blood in stool.   All other systems reviewed and are negative.      Physical Exam     Initial Vitals [01/26/25 1756]   BP Pulse Resp Temp SpO2   92/66 (!) 131 18 98.1 °F (36.7 °C) 98 %      MAP       --         Physical Exam    Nursing note and vitals reviewed.  HENT:   Head: Normocephalic and atraumatic. Mouth/Throat: Oropharynx is clear and moist.   Eyes: Pupils are equal, round, and reactive to light.   Neck: Neck supple.   Normal range of motion.  Cardiovascular:  Normal rate and regular rhythm.           Pulmonary/Chest: Effort normal and breath sounds normal.   Abdominal: Abdomen is soft. She exhibits no distension.   Musculoskeletal:         General: Normal range of motion.      Cervical back: Normal range of motion and neck supple.     Neurological: She is alert.   Skin: Skin is warm. Capillary refill takes less than 2 seconds.   Psychiatric: She has a normal mood and affect.         ED Course   Procedures  Labs Reviewed   COMPREHENSIVE METABOLIC PANEL - Abnormal       Result Value    Sodium 126 (*)     Potassium 4.1      Chloride 94 (*)     CO2 19 (*)     Anion Gap 17 (*)     Glucose 108 (*)     BUN 50 (*)     Creatinine 0.86      BUN/Creatinine Ratio 58 (*)     Calcium 9.7      Total Protein 6.6      Albumin 3.7      Globulin 2.9      A/G Ratio 1.3      Bilirubin, Total 0.4      Alk Phos 44      ALT 21      AST 27      eGFR 78     CBC WITH DIFFERENTIAL - Abnormal    WBC 9.87      RBC 2.75 (*)     Hemoglobin 9.5 (*)     Hematocrit 27.2 (*)     MCV 98.9 (*)     MCH 34.5 (*)     MCHC 34.9      RDW 12.5      Platelet Count 229      MPV 10.0      Neutrophils % 60.6      Lymphocytes % 16.8 (*)     Monocytes % 21.1 (*)     Eosinophils % 0.2 (*)     Basophils % 0.4      Immature Granulocytes % 0.9 (*)     nRBC, Auto 0.0      Neutrophils, Abs 5.98      Lymphocytes, Absolute 1.66       Monocytes, Absolute 2.08 (*)     Eosinophils, Absolute 0.02      Basophils, Absolute 0.04      Immature Granulocytes, Absolute 0.09 (*)     nRBC, Absolute 0.00      Diff Type Auto     VITAMIN B12/FOLATE, SERUM PANEL - Abnormal    Vitamin B12 179 (*)     Folate 7.5     FERRITIN - Abnormal    Ferritin 740 (*)    IRON AND TIBC - Abnormal    Iron Level 183 (*)     Iron Binding Capacity Total 225 (*)     Iron Binding Capacity Unsaturated 42 (*)     Iron Saturation 81 (*)     Transferrin 170 (*)    URINALYSIS, REFLEX TO URINE CULTURE - Abnormal    Color, UA Colorless      Clarity, UA Clear      pH, UA 6.5      Leukocytes, UA Small (*)     Nitrites, UA Positive (*)     Protein, UA Negative      Glucose, UA Normal      Ketones, UA Negative      Urobilinogen, UA Normal      Bilirubin, UA Negative      Blood, UA Negative      Specific Gravity, UA 1.010     HEMOGLOBIN AND HEMATOCRIT, BLOOD - Abnormal    Hemoglobin 7.0 (*)     Hematocrit 19.7 (*)    URINALYSIS, MICROSCOPIC - Abnormal    WBC, UA 5      RBC, UA <1      Bacteria, UA Moderate (*)     Squamous Epithelial Cells, UA Occasional (*)    POCT OCCULT BLOOD (STOOL) - Abnormal    POC Occult Blood Positive (*)    LIPASE - Normal    Lipase 39     MAGNESIUM - Normal    Magnesium 1.9     DRUG SCREEN, URINE (BEAKER) - Normal    Barbiturates, Urine Negative      Benzodiazepine, Urine Negative      Opiates, Urine Negative      Phencyclidine, Urine Negative      Amphetamine, Urine Negative      Cannabinoid, Urine Negative      Cocaine, Urine Negative      Narrative:     This screen includes the following classes of drugs at the listed cut-off:    Benzodiazepines 200 ng/ml  Cocaine metabolite 300 ng/ml  Opiates 2000 ng/ml  Barbiturates 200 ng/ml  Amphetamines 500 ng/ml  Marijuana metabs (THC) 50 ng/ml  Phencyclidine (PCP) 25 ng/ml    This is a screening test. If results do not correlate with clinical presentation, then a confirmatory send out test is advised.   PT AND PTT - Normal     PT 13.3      INR 1.02      PTT 30.3     CULTURE, URINE   CBC W/ AUTO DIFFERENTIAL    Narrative:     The following orders were created for panel order CBC auto differential.  Procedure                               Abnormality         Status                     ---------                               -----------         ------                     CBC with Differential[[id number]]       Abnormal            Final result                 Please view results for these tests on the individual orders.   SODIUM, URINE, RANDOM    Sodium, Urine 50     HEMOGLOBIN A1C    Hemoglobin A1C 4.8      Estimated Average Glucose 91     OSMOLALITY, URINE RANDOM   HEMOGLOBIN AND HEMATOCRIT, BLOOD   TYPE & SCREEN    Specimen Outdate 01/30/2025 23:59      Group & Rh O POS      Indirect Najma NEG     ABORH RETYPE    ABORH Retype O POS     PREPARE RBC SOFT    UNIT NUMBER [id number]      UNIT ABO/RH O NEG      DISPENSE STATUS Issued      Unit Expiration [id number]      Product Code B1428D00      Unit Blood Type Code 9500      CROSSMATCH INTERPRETATION Compatible            Imaging Results              XR ABDOMEN, ACUTE 2 OR MORE VIEWS WITH CHEST (Final result)  Result time 01/26/25 20:11:54      Final result by Gibran Ceron MD (01/26/25 20:11:54)                   Impression:      No acute radiographic abnormality.      Electronically signed by: Gibran Ceron  Date:    01/26/2025  Time:    20:11               Narrative:    EXAMINATION:  XR ABDOMEN ACUTE 2 OR MORE VIEWS WITH CHEST    CLINICAL HISTORY:  N/V;    TECHNIQUE:  Single-view chest and two views of the abdomen flat erect.    COMPARISON:  None    FINDINGS:  Heart is normal size.  Lungs are clear.  No effusion or pneumothorax.    No free air is detected.  No evidence of bowel obstruction.  No radiographic mass or organomegaly.  Few probable vascular calcifications.                                       Medications   acetaminophen suppository 650 mg (has no administration in time  range)   acetaminophen tablet 1,000 mg (has no administration in time range)   aluminum & magnesium hydroxide-simethicone 400-400-40 mg/5 mL suspension 30 mL (has no administration in time range)   bisacodyL EC tablet 10 mg (has no administration in time range)   dextromethorphan-guaiFENesin  mg/5 ml liquid 10 mL (has no administration in time range)   diphenhydrAMINE capsule 50 mg (has no administration in time range)   docusate sodium capsule 100 mg (has no administration in time range)   melatonin tablet 6 mg (6 mg Oral Given 1/26/25 2120)   ondansetron injection 8 mg (has no administration in time range)   traZODone tablet 50 mg (has no administration in time range)   busPIRone tablet 5 mg (5 mg Oral Given 1/26/25 2120)   pantoprazole (PROTONIX) 40 mg in 0.9% NaCl 100 mL IVPB (MB+) (8 mg/hr Intravenous New Bag 1/27/25 0431)   clorazepate tablet 7.5 mg (has no administration in time range)   cyanocobalamin injection 1,000 mcg (has no administration in time range)   0.9%  NaCl infusion (for blood administration) ( Intravenous Stopped 1/27/25 0548)   sodium chloride 0.9% bolus 1,000 mL 1,000 mL (0 mLs Intravenous Stopped 1/26/25 1941)   promethazine (PHENERGAN) 25 mg in 0.9% NaCl 50 mL IVPB (0 mg Intravenous Stopped 1/26/25 1931)   0.9% NaCl 1,000 mL with mvi, (ADULT) no.4 with vit K 3,300 unit- 150 mcg/10 mL 10 mL, thiamine 100 mg, folic acid 1 mg infusion ( Intravenous Stopped 1/27/25 0129)   pantoprazole injection 80 mg (80 mg Intravenous Given 1/26/25 2001)     Medical Decision Making  Amount and/or Complexity of Data Reviewed  Labs: ordered.    Risk  Decision regarding hospitalization.              Attending Attestation:           Physician Attestation for Scribe:  Physician Attestation Statement for Scribe #1: I, Frank Bruno MD, reviewed documentation, as scribed by Susanna Weiner in my presence, and it is both accurate and complete.             ED Course as of 01/27/25 0600   Sun Jan 26, 2025    1830 Medical decision-making:  Differential diagnosis includes nausea, vomiting, diarrhea, gastroenteritis, GI bleed.  All testing ordered and interpreted by me. [BB]   1854 Stool is black and strongly Hemoccult positive. [BB]   1908 CBC shows anemia with hematocrit of 27.  Last hematocrit 1 year ago was 38. [BB]   1915 I Made the decision to admit patient and discussed case with internal medicine hospitalist on-call who agrees with admission. [BB]      ED Course User Index  [BB] Frank Bruno MD                           Clinical Impression:  Final diagnoses:  [R00.0] Tachycardia  [K92.2] UGIB (upper gastrointestinal bleed)  [F10.288] Alcohol dependence with other alcohol-induced disorder [F10.288] (Primary)  [E87.1] Hyponatremia [E87.1]  [I10] Essential hypertension [I10]  [D62] Acute blood loss anemia [D62]  [D51.0] Pernicious anemia [D51.0]          ED Disposition Condition    Admit                 Frank Bruno MD  01/27/25 0600

## 2025-01-27 NOTE — HPI
Patient is a 59 year old female presenting to [facility] ED with complaints of abdominal cramps, nausea, and vomiting x 3 that start lasts night after dinner. She also reports difficulty swallowing leading to gagging. Overnight her symptoms progress for which she took Pepto Bismal. From 9pm to 5am, she had recurrent bowel movements described as black and sticky, but did not notice any stephenie blood in her stool. This morning around 6am she started vomiting brownish-red liquid with small chunks for a total of three times today. Denies any prior episodes of hemoptysis, melena, or hematochezia. No history of GI disease including gastric ulcer, gastritis, esophagitis, esophageal varices, or cancer. Patient reports traveling out of the country to [place] last month. She is a current smoker and uses alcohol daily. Patient also reports daily use of aspirin 325 mg x 3 daily.

## 2025-01-27 NOTE — ASSESSMENT & PLAN NOTE
Hyponatremia is likely due to Dehydration/hypovolemia or SIADH secondary to nausea and vomiting. The patient's most recent sodium results are listed below.  Recent Labs     01/26/25  1848   *     Plan  - Correct the sodium by 4-6mEq in 24 hours.   - Obtain the following studies: Urine sodium and urine osmolality.  - Will treat the hyponatremia with IV fluids  - Monitor sodium Daily.   - Patient hyponatremia is stable

## 2025-01-27 NOTE — SUBJECTIVE & OBJECTIVE
Interval History: 1 unit of PRBC transfused. Post transfusion H/H 8.6/25.8. Underwent EGD today. EGD notesBarrett's esophagus,  3 ulcers in the stomach; tissue was ablated with argon plasma coagulation; placed 2 clips unsuccessfully. Per GI to continue iv PPI for total 72 hours. Will f/u with pathology and treat H.pylori if present. After EGD patient wanted to leave AMA.Counseled the patient about the GI recommendations and the need for iv PPI as well as her repeat H/H needed to be stable before d/c. She verbalized understanding and knows the risk of leaving AMA. Will continue to monitor       Review of Systems   Constitutional:  Negative for activity change, appetite change, diaphoresis, fatigue, fever and unexpected weight change.   HENT:  Negative for nosebleeds and sore throat.    Eyes:  Negative for pain and visual disturbance.   Respiratory:  Negative for shortness of breath and wheezing.    Cardiovascular:  Negative for chest pain and palpitations.   Gastrointestinal:  Positive for abdominal pain, blood in stool, diarrhea and vomiting. Negative for constipation.   Endocrine: Negative for cold intolerance, polydipsia, polyphagia and polyuria.   Genitourinary:  Negative for dysuria, frequency and urgency.   Musculoskeletal:  Negative for arthralgias, back pain and neck pain.   Skin:  Negative for rash and wound.   Allergic/Immunologic: Negative for environmental allergies and food allergies.   Neurological:  Negative for syncope, light-headedness, numbness and headaches.   Hematological:  Does not bruise/bleed easily.   Psychiatric/Behavioral:  Negative for confusion. The patient is nervous/anxious.      Objective:     Vital Signs (Most Recent):  Temp: 97.5 °F (36.4 °C) (01/27/25 1500)  Pulse: 77 (01/27/25 1500)  Resp: 19 (01/27/25 1500)  BP: 131/76 (01/27/25 1500)  SpO2: 100 % (01/27/25 1500) Vital Signs (24h Range):  Temp:  [97 °F (36.1 °C)-98.7 °F (37.1 °C)] 97.5 °F (36.4 °C)  Pulse:  [] 77  Resp:   [10-21] 19  SpO2:  [93 %-100 %] 100 %  BP: ()/(44-76) 131/76     Weight: 59 kg (130 lb 1.1 oz)  Body mass index is 23.04 kg/m².    Intake/Output Summary (Last 24 hours) at 1/27/2025 1532  Last data filed at 1/27/2025 1405  Gross per 24 hour   Intake 2530 ml   Output --   Net 2530 ml         Physical Exam  Vitals and nursing note reviewed.   Constitutional:       General: She is not in acute distress.     Appearance: She is not ill-appearing.   HENT:      Right Ear: Tympanic membrane normal.      Left Ear: Tympanic membrane normal.      Nose: Nose normal.      Mouth/Throat:      Mouth: Mucous membranes are moist.      Pharynx: Oropharynx is clear.   Eyes:      Conjunctiva/sclera: Conjunctivae normal.      Pupils: Pupils are equal, round, and reactive to light.   Neck:      Vascular: No carotid bruit.   Cardiovascular:      Rate and Rhythm: Regular rhythm. Tachycardia present.      Pulses: Normal pulses.      Heart sounds: Normal heart sounds. No murmur heard.     No friction rub. No gallop.   Pulmonary:      Effort: Pulmonary effort is normal.      Breath sounds: Normal breath sounds. No wheezing, rhonchi or rales.   Abdominal:      General: Bowel sounds are normal.      Tenderness: There is abdominal tenderness in the left upper quadrant. There is no guarding or rebound.   Genitourinary:     Rectum: Guaiac result positive.   Musculoskeletal:      Cervical back: Normal range of motion and neck supple.      Right lower leg: No edema.      Left lower leg: No edema.   Skin:     General: Skin is warm and dry.      Capillary Refill: Capillary refill takes less than 2 seconds.   Neurological:      General: No focal deficit present.      Mental Status: She is oriented to person, place, and time.   Psychiatric:         Mood and Affect: Mood normal.         Behavior: Behavior normal.             Significant Labs: All pertinent labs within the past 24 hours have been reviewed.    Significant Imaging: I have reviewed all  pertinent imaging results/findings within the past 24 hours.

## 2025-01-27 NOTE — TRANSFER OF CARE
"Anesthesia Transfer of Care Note    Patient: Rosetta Simental    Procedure(s) Performed: * No procedures listed *    Patient location: GI    Anesthesia Type: general    Transport from OR: Transported from OR on room air with adequate spontaneous ventilation    Post pain: adequate analgesia    Post assessment: no apparent anesthetic complications    Post vital signs: stable    Level of consciousness: responds to stimulation    Nausea/Vomiting: no nausea/vomiting    Complications: none    Transfer of care protocol was followed      Last vitals: Visit Vitals  BP (!) 72/44 (BP Location: Left arm, Patient Position: Lying)   Pulse 74   Temp 36.1 °C (97 °F) (Oral)   Resp 14   Ht 5' 3" (1.6 m)   Wt 59 kg (130 lb 1.1 oz)   SpO2 (!) 94%   Breastfeeding No   BMI 23.04 kg/m²     "

## 2025-01-27 NOTE — ASSESSMENT & PLAN NOTE
Patient's hemorrhage is due to gastrointestinal bleed, patient does not have a propensity for bleeding. No prior history of GI bleed, anticoagulant used. However, the patient does take Asprin 325 x 2 approximately 1-2 times daily for pain. She reports that she trip over her dog and fell injuring her arm and face in the process. Patients most recent Hgb, Hct, platelets, and INR are listed below. Patient received a 1 L bolus of NS in the ED for low bp and tachycardia which is likely secondary to volume loss. Give Protonix 80 mg IV x 1. Rockall score 2; 5.6% risk of mortality prior to endoscopy.     Recent Labs     01/26/25  1848   HGB 9.5*   HCT 27.2*        Plan  - Will trend hemoglobin/hematocrit Every 6 hours  - Will monitor and correct any coagulation defects  - Will transfuse if Hgb is <7g/dl (<8g/dl in cases of active ACS) or if patient has rapid bleeding leading to hemodynamic instability  - Will hold aspirin due to recent bleed  - Type & screen  - PT/PTT  - Iron/TIBC/Ferritin; evaluate for iron deficiency anemia  - Protonix drip   - 0.9% NS IV at 200 ml/hr  - Will not place NGT due to lack of active bleeding and patient being clinically stable  - NPO after midnight except for sips with meds  - Supplemental 02 prn   - Consult GI; evaluate for EGD  - Phenergan 25 mg IVPB   - Urine Na; urine osm

## 2025-01-27 NOTE — ASSESSMENT & PLAN NOTE
Patient's blood pressure range in the last 24 hours was: BP  Min: 72/47  Max: 133/70.The patient's inpatient anti-hypertensive regimen is listed below:  Home Antihypertensives  Norvasc 10 mg po daily   Valsartan 160 mg po daily    Plan  - Will hold home medication due to recent GI with low BP  - Will restart after bleeding resolves and bp rebounds

## 2025-01-27 NOTE — PLAN OF CARE
[facility] - Emergency Department  Initial Discharge Assessment       Primary Care Provider: Zee Corrigan FNP    Admission Diagnosis: UGIB (upper gastrointestinal bleed) [K92.2]    Admission Date: 1/26/2025  Expected Discharge Date:     Transition of Care Barriers: None    Payor:  / Plan:  [facility] / Product Type: Government /     Extended Emergency Contact Information  Primary Emergency Contact: Sanford Simental  Mobile Phone: [phone]  Relation: Spouse  Preferred language: English   needed? No    Discharge Plan A: Home with family  Discharge Plan B: Home with family, Home Health, Long-term acute care facility (LTAC), Rehab, Skilled Nursing Facility      [facility] [place] [address]  [address]  [place] [address]  Phone: [phone] Fax: [phone]      Initial Assessment (most recent)       Adult Discharge Assessment - 01/27/25 1215          Discharge Assessment    Assessment Type Discharge Planning Assessment     Confirmed/corrected address, phone number and insurance Yes     Confirmed Demographics Correct on Facesheet     Source of Information family     People in Home spouse     Do you expect to return to your current living situation? Yes     Do you have help at home or someone to help you manage your care at home? Yes     Who are your caregiver(s) and their phone number(s)? Sanford Simental, , [phone]     Prior to hospitilization cognitive status: Unable to Assess     Current cognitive status: Unable to Assess   pt not in room    Walking or Climbing Stairs Difficulty no     Dressing/Bathing Difficulty no     Home Accessibility not wheelchair accessible;stairs to enter home     Number of Stairs, Main Entrance four     Stair Railings, Main Entrance none     Home Layout Able to live on 1st floor     Equipment Currently Used at Home none     Readmission within 30 days? No     Patient currently  being followed by outpatient case management? No     Do you currently have service(s) that help you manage your care at home? No     Do you take prescription medications? Yes     Do you have prescription coverage? Yes     Coverage      Do you have any problems affording any of your prescribed medications? No     Is the patient taking medications as prescribed? yes     Who is going to help you get home at discharge? Sanford Simental, , [phone]     How do you get to doctors appointments? car, drives self;family or friend will provide     Are you on dialysis? No     Do you take coumadin? No     Discharge Plan A Home with family     Discharge Plan B Home with family;Home Health;Long-term acute care facility (LTAC);Rehab;Skilled Nursing Facility     DME Needed Upon Discharge  none     Discharge Plan discussed with: Spouse/sig other     Name(s) and Number(s) Sanford Simental, , [phone]     Transition of Care Barriers None        Physical Activity    On average, how many days per week do you engage in moderate to strenuous exercise (like a brisk walk)? 0 days     On average, how many minutes do you engage in exercise at this level? 0 min        Financial Resource Strain    How hard is it for you to pay for the very basics like food, housing, medical care, and heating? Not hard at all        Housing Stability    In the last 12 months, was there a time when you were not able to pay the mortgage or rent on time? No     At any time in the past 12 months, were you homeless or living in a shelter (including now)? No        Transportation Needs    Has the lack of transportation kept you from medical appointments, meetings, work or from getting things needed for daily living? No        Food Insecurity    Within the past 12 months, you worried that your food would run out before you got the money to buy more. Never true     Within the past 12 months, the food you bought just didn't last and you didn't  have money to get more. Never true        Stress    Do you feel stress - tense, restless, nervous, or anxious, or unable to sleep at night because your mind is troubled all the time - these days? Only a little        Social Isolation    How often do you feel lonely or isolated from those around you?  Never        Alcohol Use    Q1: How often do you have a drink containing alcohol? 4 or more times a week     Q2: How many drinks containing alcohol do you have on a typical day when you are drinking? 7 to 9     Q3: How often do you have six or more drinks on one occasion? Daily or almost daily        Utilities    In the past 12 months has the electric, gas, oil, or water company threatened to shut off services in your home? No        Health Literacy    How often do you need to have someone help you when you read instructions, pamphlets, or other written material from your doctor or pharmacy? Rarely        OTHER    Name(s) of People in Home Sanford Simental, , [phone]                   SS spoke with pt's  due to pt not being in room at time of initial assessment. Pt lives at home with her  and plans to return when medically ready for dc. Pt does not currently require dme and is not current with hh. SDOH complete. Ss consulted for counseling, alcohol/drug treatment options discussed. Ss following

## 2025-01-27 NOTE — HOSPITAL COURSE
1/27   1 unit of PRBC transfused. Post transfusion H/H 8.6/25.8. Underwent EGD today. EGD notes Molina's esophagus, 3 ulcers in the stomach; tissue was ablated with argon plasma coagulation; placed 2 clips unsuccessfully. Per GI to continue iv PPI for total 72 hours. Will f/u with pathology and treat H.pylori if present. After EGD patient wanted to leave AMA.Counseled the patient about the GI recommendations and the need for iv PPI as well as her repeat H/H needed to be stable before d/c. She verbalized understanding and knows the risk of leaving AMA.     1/28  No further episodes of hematochezia. Not nauseated or vomiting.Hemoglobin stable at 8. Will continue iv PPI to complete total duration of 72 hours. Will monitor H/H. Cyanocobalamin  1000 mcg inj daily for B12 def anemia. Histopath report awaited.    1/29  No episodes of melena/ bloody stools. Hemoglobin steady at 7.8. Iv Protonix infusion 72 hours will be completed by today.EGD biopsy showed gastritis without H pylori infection and Molina's esophagus with inflammation.  Likely discharge tomorrow morning.    1/30  Patient is to be discharged today. Hemoglobin steady at 8.2. No bloody stools. Protonix 40 mg oral BID to continue for 8 weeks. Continue oral vit B12 and iron tablets. She is to follow up with her PCP in 1 week and GI physician in 2 weeks time. She is counseled to quit drinking alcohol and smoking cigarettes. PCP to follow up on this

## 2025-01-27 NOTE — ASSESSMENT & PLAN NOTE
Patient has a 6 pack year history of smoking. Dangers of cigarette smoking were reviewed with patient in detail. Patient was Counseled for 3-10 minutes. Nicotine replacement options were discussed. Nicotine replacement was discussed-  will not prescribe at this time.

## 2025-01-27 NOTE — ASSESSMENT & PLAN NOTE
Patient's blood pressure range in the last 24 hours was: BP  Min: 86/51  Max: 118/72.The patient's inpatient anti-hypertensive regimen is listed below:  Home Antihypertensives  Norvasc 10 mg po daily   Valsartan 160 mg po daily    Plan  - Will hold home medication due to recent GI with low BP  - Will restart after bleeding resolves and bp rebounds

## 2025-01-27 NOTE — ANESTHESIA POSTPROCEDURE EVALUATION
Anesthesia Post Evaluation    Patient: Rosetta Simental    Procedure(s) Performed: * No procedures listed *    Final Anesthesia Type: general      Patient location during evaluation: GI PACU  Patient participation: Yes- Able to Participate  Level of consciousness: responds to stimulation  Post-procedure vital signs: reviewed and stable  Pain management: adequate  Airway patency: patent  NATHAN mitigation strategies: Multimodal analgesia  PONV status at discharge: No PONV  Anesthetic complications: no      Cardiovascular status: hemodynamically stable  Respiratory status: unassisted, spontaneous ventilation and room air  Hydration status: euvolemic  Follow-up not needed.  Comments: The pt was not arousable even with painful stimulation during the procedure.   Refer to nursing note for pain/hay score upon discharge from recovery.               Vitals Value Taken Time   /64 01/27/25 1310   Temp 36.1 °C (97 °F) 01/27/25 1103   Pulse 74 01/27/25 1324   Resp 16 01/27/25 1324   SpO2 96 % 01/27/25 1324   Vitals shown include unfiled device data.      Event Time   Out of Recovery 01/27/2025 12:02:48         Pain/Hay Score: Hay Score: 10 (1/27/2025 11:11 AM)

## 2025-01-27 NOTE — CARE UPDATE
Patient wants to leave right now. She recently had an EGD done this morning. EGD notes 3 ulcers in the stomach, one bleeding vessel cauterized and advised for IV PPI for 72 hours. Had a unit of Blood transfusion today.  Counseled the patient about the GI recommendations and the need for iv PPI as well as her repeat H/H needed to be stable before d/c.  Patient wants to leave AMA as the room in the ED is not comfortable and does not  have a TV. Counseled about the risk including re bleed, and severe drop in H/H which can even lead to loss of life and death. Patient verbalized understanding.

## 2025-01-27 NOTE — ASSESSMENT & PLAN NOTE
Patient reports a six year history of daily alcohol consumption amounting to 3-5 glasses of wine. Last drink was yesterday. CIWA score 5.     Social History     Substance and Sexual Activity   Alcohol Use Yes    Alcohol/week: 4.0 standard drinks of alcohol    Types: 4 Standard drinks or equivalent per week     Plan   - Banana bag   - IVF 0.9% NS at 200 ml/hr  - UDS

## 2025-01-27 NOTE — DISCHARGE INSTRUCTIONS
EGD report  Procedure Date  1/27/25     Impression  Overall Impression:   Molina's esophagus  3 ulcers in the stomach; tissue was ablated with argon plasma coagulation; placed 2 clips unsuccessfully  Performed forceps biopsies in the stomach  Performed forceps biopsies in the esophagus  Erythematous, ulcerated mucosa in the fundus of the stomach and antrum; performed cold forceps biopsy  The of the distal esophagus had 2-3 mm of proximal migration of the Z-line suggesting Molina esophagus, biopsy were obtained.  No esophageal varices were seen.  The stomach had gastritis and 2 ulcers were present in the antrum.  One ulcer had a visible vessel and it was cauterized with APC.  I tried to place 2 hemostasis clips were they would not attach.  Gastric biopsies were obtained from the antrum and fundus.  The pyloric channel was patent.  Mucosa of the duodenal bulb through 3rd portion is normal-appearing.  Impression:  GI bleed, blood-loss anemia, gastritis, gastric ulcers, Molina esophagus.     Recommendation  Await pathology results, due: 1/29/2025      Disposition: Discharge patient to hospital room in stable condition.  Avoid nonsteroidal anti-inflammatories, continue IV PPI for 72 hours and then oral PPI for at least 8 weeks.  Follow up pathology results and treat H pylori if present.  Follow up with PCP using iron supplementation until hemoglobin returns to normal.  Follow up in GI clinic p.r.n..

## 2025-01-28 PROBLEM — D51.9 B12 DEFICIENCY ANEMIA: Status: ACTIVE | Noted: 2025-01-28

## 2025-01-28 LAB
ANION GAP SERPL CALCULATED.3IONS-SCNC: 11 MMOL/L (ref 7–16)
BUN SERPL-MCNC: 8 MG/DL (ref 10–20)
BUN/CREAT SERPL: 13 (ref 6–20)
CALCIUM SERPL-MCNC: 8.6 MG/DL (ref 8.4–10.2)
CHLORIDE SERPL-SCNC: 104 MMOL/L (ref 98–107)
CO2 SERPL-SCNC: 20 MMOL/L (ref 22–29)
CREAT SERPL-MCNC: 0.62 MG/DL (ref 0.55–1.02)
EGFR (NO RACE VARIABLE) (RUSH/TITUS): 103 ML/MIN/1.73M2
ESTROGEN SERPL-MCNC: NORMAL PG/ML
GLUCOSE SERPL-MCNC: 89 MG/DL (ref 74–100)
HCT VFR BLD AUTO: 22.5 % (ref 38–47)
HCT VFR BLD AUTO: 22.7 % (ref 38–47)
HCT VFR BLD AUTO: 23.2 % (ref 38–47)
HGB BLD-MCNC: 7.8 G/DL (ref 12–16)
HGB BLD-MCNC: 7.8 G/DL (ref 12–16)
HGB BLD-MCNC: 8 G/DL (ref 12–16)
INSULIN SERPL-ACNC: NORMAL U[IU]/ML
LAB AP GROSS DESCRIPTION: NORMAL
LAB AP LABORATORY NOTES: NORMAL
OSMOLALITY UR: 404 MOSM/KG (ref 150–1150)
POTASSIUM SERPL-SCNC: 3.7 MMOL/L (ref 3.5–5.1)
SODIUM SERPL-SCNC: 131 MMOL/L (ref 136–145)
T3RU NFR SERPL: NORMAL %

## 2025-01-28 PROCEDURE — 36415 COLL VENOUS BLD VENIPUNCTURE: CPT | Performed by: HOSPITALIST

## 2025-01-28 PROCEDURE — 11000001 HC ACUTE MED/SURG PRIVATE ROOM

## 2025-01-28 PROCEDURE — 85014 HEMATOCRIT: CPT | Performed by: HOSPITALIST

## 2025-01-28 PROCEDURE — 25000003 PHARM REV CODE 250: Performed by: INTERNAL MEDICINE

## 2025-01-28 PROCEDURE — 25000003 PHARM REV CODE 250: Performed by: HOSPITALIST

## 2025-01-28 PROCEDURE — 80048 BASIC METABOLIC PNL TOTAL CA: CPT

## 2025-01-28 PROCEDURE — 63600175 PHARM REV CODE 636 W HCPCS: Performed by: HOSPITALIST

## 2025-01-28 PROCEDURE — 63600175 PHARM REV CODE 636 W HCPCS

## 2025-01-28 PROCEDURE — 36415 COLL VENOUS BLD VENIPUNCTURE: CPT

## 2025-01-28 PROCEDURE — 85018 HEMOGLOBIN: CPT

## 2025-01-28 PROCEDURE — 99232 SBSQ HOSP IP/OBS MODERATE 35: CPT | Mod: GC,,, | Performed by: FAMILY MEDICINE

## 2025-01-28 PROCEDURE — 25000003 PHARM REV CODE 250

## 2025-01-28 PROCEDURE — 85018 HEMOGLOBIN: CPT | Performed by: HOSPITALIST

## 2025-01-28 RX ADMIN — BUSPIRONE HYDROCHLORIDE 5 MG: 5 TABLET ORAL at 09:01

## 2025-01-28 RX ADMIN — SODIUM CHLORIDE 8 MG/HR: 900 INJECTION INTRAVENOUS at 08:01

## 2025-01-28 RX ADMIN — SODIUM CHLORIDE 8 MG/HR: 900 INJECTION INTRAVENOUS at 03:01

## 2025-01-28 RX ADMIN — SODIUM CHLORIDE 8 MG/HR: 900 INJECTION INTRAVENOUS at 02:01

## 2025-01-28 RX ADMIN — BUSPIRONE HYDROCHLORIDE 5 MG: 5 TABLET ORAL at 02:01

## 2025-01-28 RX ADMIN — ACETAMINOPHEN 1000 MG: 500 TABLET ORAL at 08:01

## 2025-01-28 RX ADMIN — SODIUM CHLORIDE 8 MG/HR: 900 INJECTION INTRAVENOUS at 09:01

## 2025-01-28 RX ADMIN — FERROUS SULFATE TAB 325 MG (65 MG ELEMENTAL FE) 1 EACH: 325 (65 FE) TAB at 09:01

## 2025-01-28 RX ADMIN — CLORAZEPATE DIPOTASSIUM 7.5 MG: 3.75 TABLET ORAL at 08:01

## 2025-01-28 RX ADMIN — CYANOCOBALAMIN 1000 MCG: 1000 INJECTION INTRAMUSCULAR; SUBCUTANEOUS at 09:01

## 2025-01-28 RX ADMIN — BUSPIRONE HYDROCHLORIDE 5 MG: 5 TABLET ORAL at 08:01

## 2025-01-28 NOTE — ASSESSMENT & PLAN NOTE
Patient's hemorrhage is due to gastrointestinal bleed, patient does not have a propensity for bleeding. No prior history of GI bleed, anticoagulant used. However, the patient does take Asprin 325 x 2 approximately 1-2 times daily for pain. She reports that she trip over her dog and fell injuring her arm and face in the process. Patients most recent Hgb, Hct, platelets, and INR are listed below. Patient received a 1 L bolus of NS in the ED for low bp and tachycardia which is likely secondary to volume loss. Give Protonix 80 mg IV x 1. Rockall score 2; 5.6% risk of mortality prior to endoscopy.     Recent Labs     01/26/25  1848 01/27/25  0003 01/27/25  0632 01/27/25  1758 01/28/25  0000 01/28/25  0620   HGB 9.5* 7.0*   < > 8.1* 7.8* 8.0*   HCT 27.2* 19.7*   < > 24.3* 22.5* 23.2*     --   --   --   --   --    INR  --  1.02  --   --   --   --     < > = values in this interval not displayed.       Plan  - Will trend hemoglobin/hematocrit Every 6 hours  - Will monitor and correct any coagulation defects  - Will transfuse if Hgb is <7g/dl (<8g/dl in cases of active ACS) or if patient has rapid bleeding leading to hemodynamic instability  - Will hold aspirin due to recent bleed  - Type & screen  - PT/PTT  - Iron/TIBC/Ferritin; evaluate for iron deficiency anemia  - Protonix drip   - 0.9% NS IV at 200 ml/hr  - Will not place NGT due to lack of active bleeding and patient being clinically stable  - NPO after midnight except for sips with meds  - Supplemental 02 prn   - Consult GI; evaluate for EGD    1/27  GI consulted. EGD performed. Per GI:  Molina's esophagus  3 ulcers in the stomach; the lesion was ablated with argon plasma coagulation; placed 2 clips unsuccessfully. Bleeding noted to first ulcer apc done  Performed multiple forceps biopsies in the stomach  Performed multiple forceps biopsies in the esophagus  Erythematous and ulcerated mucosa in the fundus of the stomach and antrum; performed cold forceps  biopsy  Avoid nonsteroidal anti-inflammatories, continue IV PPI for 72 hours and then oral PPI for at least 8 weeks. Follow up pathology results and treat H pylori if present.    1/28  No further episodes of GI bleed. Not nauseated or vomiting.Hemoglobin stable at 8.   Will continue iv PPI to complete total duration of 72 hours. Will monitor H/H.  Will follow up pathology results and treat H pylori if present.

## 2025-01-28 NOTE — PROGRESS NOTES
[facility] Medicine  Progress Note    Patient Name: Rosetta Simental  MRN: [id number]  Patient Class: IP- Inpatient   Admission Date: 1/26/2025  Length of Stay: 2 days  Attending Physician: Jer Blas Jr., MD  Primary Care Provider: Zee Corrigan FNP        Subjective     Principal Problem:UGIB (upper gastrointestinal bleed)        HPI:  Patient is a 59 year old female presenting to [facility] ED with complaints of abdominal cramps, nausea, and vomiting x 3 that start lasts night after dinner. She also reports difficulty swallowing leading to gagging. Overnight her symptoms progress for which she took Pepto Bismal. From 9pm to 5am, she had recurrent bowel movements described as black and sticky, but did not notice any stephenie blood in her stool. This morning around 6am she started vomiting brownish-red liquid with small chunks for a total of three times today. Denies any prior episodes of hemoptysis, melena, or hematochezia. No history of GI disease including gastric ulcer, gastritis, esophagitis, esophageal varices, or cancer. Patient reports traveling out of the country to [place] last month. She is a current smoker and uses alcohol daily. Patient also reports daily use of aspirin 325 mg x 3 daily.      Overview/Hospital Course:    1/27   1 unit of PRBC transfused. Post transfusion H/H 8.6/25.8. Underwent EGD today. EGD notes Molina's esophagus, 3 ulcers in the stomach; tissue was ablated with argon plasma coagulation; placed 2 clips unsuccessfully. Per GI to continue iv PPI for total 72 hours. Will f/u with pathology and treat H.pylori if present. After EGD patient wanted to leave AMA.Counseled the patient about the GI recommendations and the need for iv PPI as well as her repeat H/H needed to be stable before d/c. She verbalized understanding and knows the risk of leaving AMA.     1/28  No further episodes of hematochezia. Not nauseated or  vomiting.Hemoglobin stable at 8. Will continue iv PPI to complete total duration of 72 hours. Will monitor H/H. Cyanocobalamin  1000 mcg inj daily for B12 def anemia. Histopath report awaited.    Interval History: No acute events overnight. No further episodes of GI bleed. Not nauseated or vomiting.Hemoglobin stable at 8. Will continue iv PPI to complete total duration of 72 hours. Will monitor H/H. Cyanocobalamin  1000 mcg inj daily for B12 def anemia. Histopath report awaited.    Review of Systems  Objective:     Vital Signs (Most Recent):  Temp: 98 °F (36.7 °C) (01/28/25 1218)  Pulse: 85 (01/28/25 1218)  Resp: 18 (01/28/25 1218)  BP: 131/80 (01/28/25 1218)  SpO2: 96 % (01/28/25 1218) Vital Signs (24h Range):  Temp:  [97.5 °F (36.4 °C)-98.7 °F (37.1 °C)] 98 °F (36.7 °C)  Pulse:  [72-85] 85  Resp:  [16-19] 18  SpO2:  [95 %-100 %] 96 %  BP: (114-131)/(61-80) 131/80     Weight: 59 kg (130 lb 1.1 oz)  Body mass index is 23.04 kg/m².    Intake/Output Summary (Last 24 hours) at 1/28/2025 1407  Last data filed at 1/28/2025 0624  Gross per 24 hour   Intake 480 ml   Output 2 ml   Net 478 ml         Physical Exam        Significant Labs: All pertinent labs within the past 24 hours have been reviewed.    Significant Imaging: I have reviewed all pertinent imaging results/findings within the past 24 hours.    Assessment and Plan     * UGIB (upper gastrointestinal bleed)  Patient's hemorrhage is due to gastrointestinal bleed, patient does not have a propensity for bleeding. No prior history of GI bleed, anticoagulant used. However, the patient does take Asprin 325 x 2 approximately 1-2 times daily for pain. She reports that she trip over her dog and fell injuring her arm and face in the process. Patients most recent Hgb, Hct, platelets, and INR are listed below. Patient received a 1 L bolus of NS in the ED for low bp and tachycardia which is likely secondary to volume loss. Give Protonix 80 mg IV x 1. Rockall score 2; 5.6% risk  of mortality prior to endoscopy.     Recent Labs     01/26/25  1848 01/27/25  0003 01/27/25  0632 01/27/25  1758 01/28/25  0000 01/28/25  0620   HGB 9.5* 7.0*   < > 8.1* 7.8* 8.0*   HCT 27.2* 19.7*   < > 24.3* 22.5* 23.2*     --   --   --   --   --    INR  --  1.02  --   --   --   --     < > = values in this interval not displayed.       Plan  - Will trend hemoglobin/hematocrit Every 6 hours  - Will monitor and correct any coagulation defects  - Will transfuse if Hgb is <7g/dl (<8g/dl in cases of active ACS) or if patient has rapid bleeding leading to hemodynamic instability  - Will hold aspirin due to recent bleed  - Type & screen  - PT/PTT  - Iron/TIBC/Ferritin; evaluate for iron deficiency anemia  - Protonix drip   - 0.9% NS IV at 200 ml/hr  - Will not place NGT due to lack of active bleeding and patient being clinically stable  - NPO after midnight except for sips with meds  - Supplemental 02 prn   - Consult GI; evaluate for EGD    1/27  GI consulted. EGD performed. Per GI:  Molina's esophagus  3 ulcers in the stomach; the lesion was ablated with argon plasma coagulation; placed 2 clips unsuccessfully. Bleeding noted to first ulcer apc done  Performed multiple forceps biopsies in the stomach  Performed multiple forceps biopsies in the esophagus  Erythematous and ulcerated mucosa in the fundus of the stomach and antrum; performed cold forceps biopsy  Avoid nonsteroidal anti-inflammatories, continue IV PPI for 72 hours and then oral PPI for at least 8 weeks. Follow up pathology results and treat H pylori if present.    1/28  No further episodes of GI bleed. Not nauseated or vomiting.Hemoglobin stable at 8.   Will continue iv PPI to complete total duration of 72 hours. Will monitor H/H.  Will follow up pathology results and treat H pylori if present.    Hyponatremia  Hyponatremia is likely due to Dehydration/hypovolemia or SIADH secondary to nausea and vomiting. The patient's most recent sodium results are  listed below.  Recent Labs     01/26/25  1848 01/28/25  0620   * 131*       Plan  - Correct the sodium by 4-6mEq in 24 hours.   - Obtain the following studies: Urine sodium and urine osmolality.  - Will treat the hyponatremia with IV fluids  - Monitor sodium Daily.   - Patient hyponatremia is stable    1/28  Improving  Pending urine osmolality        Anxiety  Continue home Buspar 5 mg po tid      Tobacco dependence  Patient has a 6 pack year history of smoking. Dangers of cigarette smoking were reviewed with patient in detail. Patient was Counseled for 3-10 minutes. Nicotine replacement options were discussed. Nicotine replacement was discussed-  will not prescribe at this time.     Alcohol dependence  Patient reports a six year history of daily alcohol consumption amounting to 3-5 glasses of wine. Last drink was yesterday. CIWA score 5.     Social History     Substance and Sexual Activity   Alcohol Use Yes    Alcohol/week: 4.0 standard drinks of alcohol    Types: 4 Standard drinks or equivalent per week     Plan   - Banana bag   - IVF 0.9% NS at 200 ml/hr  - UDS    1/28  Low CIWA score  Counseled on the need to quit alcohol.  Patient verbalized understanding      Essential hypertension  Patient's blood pressure range in the last 24 hours was: BP  Min: 114/63  Max: 131/80.The patient's inpatient anti-hypertensive regimen is listed below:  Home Antihypertensives  Norvasc 10 mg po daily   Valsartan 160 mg po daily    Plan  - Will hold home medication due to recent GI with low BP  - Will restart after bleeding resolves and bp rebounds           VTE Risk Mitigation (From admission, onward)           Ordered     Place MELISSA hose  Until discontinued         01/26/25 1923     IP VTE LOW RISK PATIENT  Once         01/26/25 1923                    Discharge Planning   NAYELI:      Code Status: Full Code   Medical Readiness for Discharge Date:   Discharge Plan A: Home with family                        Leonides GiraldoMichael,  MD  Department of Hospital Medicine   [facility]

## 2025-01-28 NOTE — ASSESSMENT & PLAN NOTE
Patient has a 6 pack year history of smoking. Dangers of cigarette smoking were reviewed with patient in detail. Patient was Counseled for 3-10 minutes. Nicotine replacement options were discussed. Nicotine replacement was discussed-  will not prescribe at this time.    Initial (On Arrival)

## 2025-01-28 NOTE — PLAN OF CARE
Problem: Gastrointestinal Bleeding  Goal: Optimal Coping with Acute Illness  1/27/2025 2304 by Stephanie Francis RN  Outcome: Progressing  1/27/2025 1944 by Stephanie Francis RN  Outcome: Progressing  Goal: Hemostasis  1/27/2025 2304 by Stephanie Francis RN  Outcome: Progressing  1/27/2025 1944 by Stephanie Francis RN  Outcome: Progressing     Problem: Adult Inpatient Plan of Care  Goal: Plan of Care Review  1/27/2025 2304 by Stephanie Francis RN  Outcome: Progressing  1/27/2025 1944 by Stephanie Francis RN  Outcome: Progressing  Goal: Patient-Specific Goal (Individualized)  1/27/2025 2304 by Stephanie Francis RN  Outcome: Progressing  1/27/2025 1944 by Stephanie Francis RN  Outcome: Progressing  Goal: Absence of Hospital-Acquired Illness or Injury  1/27/2025 2304 by Stephanie Francis RN  Outcome: Progressing  1/27/2025 1944 by Stephanie Francis RN  Outcome: Progressing  Goal: Optimal Comfort and Wellbeing  1/27/2025 2304 by Stephanie Francis RN  Outcome: Progressing  1/27/2025 1944 by Stephanie Francis RN  Outcome: Progressing  Goal: Readiness for Transition of Care  1/27/2025 2304 by Stephanie Francis RN  Outcome: Progressing  1/27/2025 1944 by Stephanie Francis RN  Outcome: Progressing

## 2025-01-28 NOTE — ASSESSMENT & PLAN NOTE
Patient's blood pressure range in the last 24 hours was: BP  Min: 114/63  Max: 131/80.The patient's inpatient anti-hypertensive regimen is listed below:  Home Antihypertensives  Norvasc 10 mg po daily   Valsartan 160 mg po daily    Plan  - Will hold home medication due to recent GI with low BP  - Will restart after bleeding resolves and bp rebounds

## 2025-01-28 NOTE — ASSESSMENT & PLAN NOTE
Hyponatremia is likely due to Dehydration/hypovolemia or SIADH secondary to nausea and vomiting. The patient's most recent sodium results are listed below.  Recent Labs     01/26/25  1848 01/28/25  0620   * 131*       Plan  - Correct the sodium by 4-6mEq in 24 hours.   - Obtain the following studies: Urine sodium and urine osmolality.  - Will treat the hyponatremia with IV fluids  - Monitor sodium Daily.   - Patient hyponatremia is stable    1/28  Improving  Pending urine osmolality

## 2025-01-28 NOTE — PLAN OF CARE
01/28/25 1352   Rounds   Attendance Provider;Nurse ;Charge nurse;Physical therapist;Pharmacist   Discharge Plan A Home with family   Why the patient remains in the hospital Requires continued medical care   Transition of Care Barriers None     Pt continues to receive a 72 hour PPI infusion. CM following for discharge needs.

## 2025-01-28 NOTE — NURSING
"Patient Hgb 7.8 Hct 22.5. Secure chat Dr. Payne making her aware of results. No new orders given .  Dr. Payne states," she will pass the results along to day shift doctor."  "

## 2025-01-28 NOTE — ASSESSMENT & PLAN NOTE
Megaloblastic anemia -raised MCV, low vit B12 levels(179) in addition to acute blood loss anemia due to upper GI bleed. Most recent hemoglobin and hematocrit are listed below.  Recent Labs     01/27/25  1758 01/28/25  0000 01/28/25  0620   HGB 8.1* 7.8* 8.0*   HCT 24.3* 22.5* 23.2*     Plan  Continue with  cyanocobalamin inj 1000 mcg once daily  Monitor CBC and vit B12 levels

## 2025-01-28 NOTE — PROGRESS NOTES
This patient remains in the hospital.  EGD biopsy showed gastritis without H pylori infection and Molina's esophagus with inflammation.  Recommendation:  Continue PPI.  Avoid anti-inflammatories.  Repeat EGD in 3 years.

## 2025-01-28 NOTE — ASSESSMENT & PLAN NOTE
Patient reports a six year history of daily alcohol consumption amounting to 3-5 glasses of wine. Last drink was yesterday. CIWA score 5.     Social History     Substance and Sexual Activity   Alcohol Use Yes    Alcohol/week: 4.0 standard drinks of alcohol    Types: 4 Standard drinks or equivalent per week     Plan   - Banana bag   - IVF 0.9% NS at 200 ml/hr  - UDS    1/28  Low CIWA score  Counseled on the need to quit alcohol.  Patient verbalized understanding

## 2025-01-28 NOTE — SUBJECTIVE & OBJECTIVE
Interval History: No acute events overnight. No further episodes of GI bleed. Not nauseated or vomiting.Hemoglobin stable at 8. Will continue iv PPI to complete total duration of 72 hours. Will monitor H/H. Cyanocobalamin  1000 mcg inj daily for B12 def anemia. Histopath report awaited.    Review of Systems  Objective:     Vital Signs (Most Recent):  Temp: 98 °F (36.7 °C) (01/28/25 1218)  Pulse: 85 (01/28/25 1218)  Resp: 18 (01/28/25 1218)  BP: 131/80 (01/28/25 1218)  SpO2: 96 % (01/28/25 1218) Vital Signs (24h Range):  Temp:  [97.5 °F (36.4 °C)-98.7 °F (37.1 °C)] 98 °F (36.7 °C)  Pulse:  [72-85] 85  Resp:  [16-19] 18  SpO2:  [95 %-100 %] 96 %  BP: (114-131)/(61-80) 131/80     Weight: 59 kg (130 lb 1.1 oz)  Body mass index is 23.04 kg/m².    Intake/Output Summary (Last 24 hours) at 1/28/2025 1407  Last data filed at 1/28/2025 0624  Gross per 24 hour   Intake 480 ml   Output 2 ml   Net 478 ml         Physical Exam        Significant Labs: All pertinent labs within the past 24 hours have been reviewed.    Significant Imaging: I have reviewed all pertinent imaging results/findings within the past 24 hours.

## 2025-01-28 NOTE — PROGRESS NOTES
[facility]  Wound Care    Patient Name:  Rosetta Simental   MRN:  [id number]  Date: 1/28/2025  Diagnosis: UGIB (upper gastrointestinal bleed)    History:     Past Medical History:   Diagnosis Date    Alcohol dependence     Molina esophagus with esophagitis     Essential hypertension 04/27/2021    Peptic ulcer hemorrhage 01/27/2025    egd with argon plasma hemostasis    Tobacco dependence        Social History     Socioeconomic History    Marital status:    Tobacco Use    Smoking status: Every Day     Current packs/day: 0.75     Average packs/day: 0.8 packs/day for 20.0 years (15.0 ttl pk-yrs)     Types: Cigarettes     Passive exposure: Current    Smokeless tobacco: Never   Substance and Sexual Activity    Alcohol use: Yes     Alcohol/week: 4.0 standard drinks of alcohol     Types: 4 Standard drinks or equivalent per week    Drug use: Never     Social Drivers of Health     Financial Resource Strain: Low Risk  (1/27/2025)    Overall Financial Resource Strain (CARDIA)     Difficulty of Paying Living Expenses: Not hard at all   Food Insecurity: No Food Insecurity (1/27/2025)    Hunger Vital Sign     Worried About Running Out of Food in the Last Year: Never true     Ran Out of Food in the Last Year: Never true   Transportation Needs: No Transportation Needs (1/27/2025)    TRANSPORTATION NEEDS     Transportation : No   Physical Activity: Inactive (1/27/2025)    Exercise Vital Sign     Days of Exercise per Week: 0 days     Minutes of Exercise per Session: 0 min   Stress: No Stress Concern Present (1/27/2025)    [employer] - Occupational Stress Questionnaire     Feeling of Stress : Only a little   Housing Stability: Low Risk  (1/27/2025)    Housing Stability Vital Sign     Unable to Pay for Housing in the Last Year: No     Homeless in the Last Year: No       Precautions:     Allergies as of 01/26/2025    (No Known Allergies)       WOC Assessment Details/Treatment      Narrative: Seen patient for initial preventative skin care measures.  Patient ambulates.  No foam borders, redness, or open wounds noted to bilateral heels and sacral.  Consult wound care of any findings.    01/28/2025

## 2025-01-29 LAB
ANION GAP SERPL CALCULATED.3IONS-SCNC: 13 MMOL/L (ref 7–16)
BASOPHILS # BLD AUTO: 0.04 K/UL (ref 0–0.2)
BASOPHILS NFR BLD AUTO: 0.9 % (ref 0–1)
BUN SERPL-MCNC: 5 MG/DL (ref 10–20)
BUN/CREAT SERPL: 8 (ref 6–20)
CALCIUM SERPL-MCNC: 8.4 MG/DL (ref 8.4–10.2)
CHLORIDE SERPL-SCNC: 101 MMOL/L (ref 98–107)
CO2 SERPL-SCNC: 20 MMOL/L (ref 22–29)
CREAT SERPL-MCNC: 0.6 MG/DL (ref 0.55–1.02)
DIFFERENTIAL METHOD BLD: ABNORMAL
EGFR (NO RACE VARIABLE) (RUSH/TITUS): 104 ML/MIN/1.73M2
EOSINOPHIL # BLD AUTO: 0.06 K/UL (ref 0–0.5)
EOSINOPHIL NFR BLD AUTO: 1.4 % (ref 1–4)
ERYTHROCYTE [DISTWIDTH] IN BLOOD BY AUTOMATED COUNT: 13.6 % (ref 11.5–14.5)
GLUCOSE SERPL-MCNC: 89 MG/DL (ref 74–100)
HCT VFR BLD AUTO: 23.4 % (ref 38–47)
HCT VFR BLD AUTO: 23.5 % (ref 38–47)
HGB BLD-MCNC: 7.8 G/DL (ref 12–16)
HGB BLD-MCNC: 8.1 G/DL (ref 12–16)
IMM GRANULOCYTES # BLD AUTO: 0.04 K/UL (ref 0–0.04)
IMM GRANULOCYTES NFR BLD: 0.9 % (ref 0–0.4)
LYMPHOCYTES # BLD AUTO: 0.96 K/UL (ref 1–4.8)
LYMPHOCYTES NFR BLD AUTO: 22 % (ref 27–41)
MCH RBC QN AUTO: 33.1 PG (ref 27–31)
MCHC RBC AUTO-ENTMCNC: 33.3 G/DL (ref 32–36)
MCV RBC AUTO: 99.2 FL (ref 80–96)
MONOCYTES # BLD AUTO: 1.06 K/UL (ref 0–0.8)
MONOCYTES NFR BLD AUTO: 24.3 % (ref 2–6)
MPC BLD CALC-MCNC: 10.2 FL (ref 9.4–12.4)
NEUTROPHILS # BLD AUTO: 2.2 K/UL (ref 1.8–7.7)
NEUTROPHILS NFR BLD AUTO: 50.5 % (ref 53–65)
NRBC # BLD AUTO: 0 X10E3/UL
NRBC, AUTO (.00): 0 %
PLATELET # BLD AUTO: 195 K/UL (ref 150–400)
POTASSIUM SERPL-SCNC: 4 MMOL/L (ref 3.5–5.1)
RBC # BLD AUTO: 2.36 M/UL (ref 4.2–5.4)
SODIUM SERPL-SCNC: 130 MMOL/L (ref 136–145)
UA COMPLETE W REFLEX CULTURE PNL UR: ABNORMAL
UA COMPLETE W REFLEX CULTURE PNL UR: ABNORMAL
WBC # BLD AUTO: 4.36 K/UL (ref 4.5–11)

## 2025-01-29 PROCEDURE — 80048 BASIC METABOLIC PNL TOTAL CA: CPT

## 2025-01-29 PROCEDURE — 99232 SBSQ HOSP IP/OBS MODERATE 35: CPT | Mod: GC,,, | Performed by: FAMILY MEDICINE

## 2025-01-29 PROCEDURE — 25000003 PHARM REV CODE 250: Performed by: HOSPITALIST

## 2025-01-29 PROCEDURE — 63600175 PHARM REV CODE 636 W HCPCS

## 2025-01-29 PROCEDURE — 25000003 PHARM REV CODE 250

## 2025-01-29 PROCEDURE — 63600175 PHARM REV CODE 636 W HCPCS: Performed by: HOSPITALIST

## 2025-01-29 PROCEDURE — 25000003 PHARM REV CODE 250: Performed by: INTERNAL MEDICINE

## 2025-01-29 PROCEDURE — 85025 COMPLETE CBC W/AUTO DIFF WBC: CPT

## 2025-01-29 PROCEDURE — 85014 HEMATOCRIT: CPT

## 2025-01-29 PROCEDURE — 11000001 HC ACUTE MED/SURG PRIVATE ROOM

## 2025-01-29 PROCEDURE — 36415 COLL VENOUS BLD VENIPUNCTURE: CPT

## 2025-01-29 RX ADMIN — SODIUM CHLORIDE 8 MG/HR: 900 INJECTION INTRAVENOUS at 07:01

## 2025-01-29 RX ADMIN — BUSPIRONE HYDROCHLORIDE 5 MG: 5 TABLET ORAL at 03:01

## 2025-01-29 RX ADMIN — FERROUS SULFATE TAB 325 MG (65 MG ELEMENTAL FE) 1 EACH: 325 (65 FE) TAB at 08:01

## 2025-01-29 RX ADMIN — BUSPIRONE HYDROCHLORIDE 5 MG: 5 TABLET ORAL at 08:01

## 2025-01-29 RX ADMIN — CLORAZEPATE DIPOTASSIUM 7.5 MG: 3.75 TABLET ORAL at 08:01

## 2025-01-29 RX ADMIN — SODIUM CHLORIDE 8 MG/HR: 900 INJECTION INTRAVENOUS at 04:01

## 2025-01-29 RX ADMIN — CYANOCOBALAMIN 1000 MCG: 1000 INJECTION INTRAMUSCULAR; SUBCUTANEOUS at 08:01

## 2025-01-29 RX ADMIN — SODIUM CHLORIDE 8 MG/HR: 900 INJECTION INTRAVENOUS at 01:01

## 2025-01-29 RX ADMIN — CLORAZEPATE DIPOTASSIUM 7.5 MG: 3.75 TABLET ORAL at 10:01

## 2025-01-29 RX ADMIN — SODIUM CHLORIDE 8 MG/HR: 900 INJECTION INTRAVENOUS at 11:01

## 2025-01-29 RX ADMIN — Medication 6 MG: at 08:01

## 2025-01-29 NOTE — ASSESSMENT & PLAN NOTE
Hyponatremia is likely due to Dehydration/hypovolemia or SIADH secondary to nausea and vomiting. The patient's most recent sodium results are listed below.  Recent Labs     01/26/25  1848 01/28/25  0620 01/29/25  0513   * 131* 130*       Plan  - Correct the sodium by 4-6mEq in 24 hours.   - Obtain the following studies: Urine sodium and urine osmolality.  - Will treat the hyponatremia with IV fluids  - Monitor sodium Daily.   - Patient hyponatremia is stable    1/28  Improving

## 2025-01-29 NOTE — PROGRESS NOTES
[facility] Medicine  Progress Note    Patient Name: Rosetta Simental  MRN: [id number]  Patient Class: IP- Inpatient   Admission Date: 1/26/2025  Length of Stay: 3 days  Attending Physician: Jer Blas Jr., MD  Primary Care Provider: Zee Corrigan FNP        Subjective     Principal Problem:UGIB (upper gastrointestinal bleed)        HPI:  Patient is a 59 year old female presenting to [facility] ED with complaints of abdominal cramps, nausea, and vomiting x 3 that start lasts night after dinner. She also reports difficulty swallowing leading to gagging. Overnight her symptoms progress for which she took Pepto Bismal. From 9pm to 5am, she had recurrent bowel movements described as black and sticky, but did not notice any stephenie blood in her stool. This morning around 6am she started vomiting brownish-red liquid with small chunks for a total of three times today. Denies any prior episodes of hemoptysis, melena, or hematochezia. No history of GI disease including gastric ulcer, gastritis, esophagitis, esophageal varices, or cancer. Patient reports traveling out of the country to [place] last month. She is a current smoker and uses alcohol daily. Patient also reports daily use of aspirin 325 mg x 3 daily.      Overview/Hospital Course:    1/27   1 unit of PRBC transfused. Post transfusion H/H 8.6/25.8. Underwent EGD today. EGD notes Molina's esophagus, 3 ulcers in the stomach; tissue was ablated with argon plasma coagulation; placed 2 clips unsuccessfully. Per GI to continue iv PPI for total 72 hours. Will f/u with pathology and treat H.pylori if present. After EGD patient wanted to leave AMA.Counseled the patient about the GI recommendations and the need for iv PPI as well as her repeat H/H needed to be stable before d/c. She verbalized understanding and knows the risk of leaving AMA.     1/28  No further episodes of hematochezia. Not nauseated or  vomiting.Hemoglobin stable at 8. Will continue iv PPI to complete total duration of 72 hours. Will monitor H/H. Cyanocobalamin  1000 mcg inj daily for B12 def anemia. Histopath report awaited.    1/29  No episodes of melena/ bloody stools. Hemoglobin steady at 7.8. Iv Protonix infusion 72 hours will be completed by today.EGD biopsy showed gastritis without H pylori infection and Molina's esophagus with inflammation.  Likely discharge tomorrow morning.    Interval History: No acute events overnight. No episodes of melena/ bloody stools. Hemoglobin steady at 7.8.Not nauseated or vomiting. Iv Protonix infusion 72 hours will be completed by today evening. EGD biopsy showed gastritis without H pylori infection and Molina's esophagus with inflammation.  Likely discharge tomorrow morning.    Review of Systems   Constitutional:  Negative for activity change, appetite change, diaphoresis, fatigue, fever and unexpected weight change.   HENT:  Negative for nosebleeds and sore throat.    Eyes:  Negative for pain and visual disturbance.   Respiratory:  Negative for shortness of breath and wheezing.    Cardiovascular:  Negative for chest pain and palpitations.   Gastrointestinal:  Negative for constipation.   Endocrine: Negative for cold intolerance, polydipsia, polyphagia and polyuria.   Genitourinary:  Negative for dysuria, frequency and urgency.   Musculoskeletal:  Negative for arthralgias, back pain and neck pain.   Skin:  Negative for rash and wound.   Allergic/Immunologic: Negative for environmental allergies and food allergies.   Neurological:  Negative for syncope, light-headedness, numbness and headaches.   Hematological:  Does not bruise/bleed easily.   Psychiatric/Behavioral:  Negative for confusion.      Objective:     Vital Signs (Most Recent):  Temp: 98.4 °F (36.9 °C) (01/29/25 1118)  Pulse: 65 (01/29/25 1118)  Resp: 18 (01/29/25 1118)  BP: (!) 155/54 (01/29/25 1118)  SpO2: 99 % (01/29/25 1118) Vital Signs (24h  Range):  Temp:  [97.7 °F (36.5 °C)-99 °F (37.2 °C)] 98.4 °F (36.9 °C)  Pulse:  [65-87] 65  Resp:  [16-18] 18  SpO2:  [94 %-99 %] 99 %  BP: (129-155)/(54-85) 155/54     Weight: 59 kg (130 lb 1.1 oz)  Body mass index is 23.04 kg/m².  No intake or output data in the 24 hours ending 01/29/25 1134      Physical Exam  Vitals and nursing note reviewed.   Constitutional:       General: She is not in acute distress.     Appearance: She is not ill-appearing.   HENT:      Right Ear: Tympanic membrane normal.      Left Ear: Tympanic membrane normal.      Nose: Nose normal.      Mouth/Throat:      Mouth: Mucous membranes are moist.      Pharynx: Oropharynx is clear.   Eyes:      Conjunctiva/sclera: Conjunctivae normal.      Pupils: Pupils are equal, round, and reactive to light.   Neck:      Vascular: No carotid bruit.   Cardiovascular:      Rate and Rhythm: Regular rhythm.      Pulses: Normal pulses.      Heart sounds: Normal heart sounds. No murmur heard.     No friction rub. No gallop.   Pulmonary:      Effort: Pulmonary effort is normal.      Breath sounds: Normal breath sounds. No wheezing, rhonchi or rales.   Abdominal:      General: Bowel sounds are normal.      Tenderness: There is no guarding or rebound.   Musculoskeletal:      Cervical back: Normal range of motion and neck supple.      Right lower leg: No edema.      Left lower leg: No edema.   Skin:     General: Skin is warm and dry.      Capillary Refill: Capillary refill takes less than 2 seconds.   Neurological:      General: No focal deficit present.      Mental Status: She is oriented to person, place, and time.   Psychiatric:         Mood and Affect: Mood normal.         Behavior: Behavior normal.             Significant Labs: All pertinent labs within the past 24 hours have been reviewed.    Significant Imaging: I have reviewed all pertinent imaging results/findings within the past 24 hours.    Assessment and Plan     * UGIB (upper gastrointestinal  bleed)  Patient's hemorrhage is due to gastrointestinal bleed, patient does not have a propensity for bleeding. No prior history of GI bleed, anticoagulant used. However, the patient does take Asprin 325 x 2 approximately 1-2 times daily for pain. She reports that she trip over her dog and fell injuring her arm and face in the process. Patients most recent Hgb, Hct, platelets, and INR are listed below. Patient received a 1 L bolus of NS in the ED for low bp and tachycardia which is likely secondary to volume loss. Give Protonix 80 mg IV x 1. Rockall score 2; 5.6% risk of mortality prior to endoscopy.     Recent Labs     01/26/25  1848 01/27/25  0003 01/27/25  0632 01/28/25  0620 01/28/25  1559 01/29/25  0512   HGB 9.5* 7.0*   < > 8.0* 7.8* 7.8*   HCT 27.2* 19.7*   < > 23.2* 22.7* 23.4*     --   --   --   --  195   INR  --  1.02  --   --   --   --     < > = values in this interval not displayed.       Plan  - Will trend hemoglobin/hematocrit Every 6 hours  - Will monitor and correct any coagulation defects  - Will transfuse if Hgb is <7g/dl (<8g/dl in cases of active ACS) or if patient has rapid bleeding leading to hemodynamic instability  - Will hold aspirin due to recent bleed  - Type & screen  - PT/PTT  - Iron/TIBC/Ferritin; evaluate for iron deficiency anemia  - Protonix drip   - 0.9% NS IV at 200 ml/hr  - Will not place NGT due to lack of active bleeding and patient being clinically stable  - NPO after midnight except for sips with meds  - Supplemental 02 prn   - Consult GI; evaluate for EGD    1/27  GI consulted. EGD performed. Per GI:  Molina's esophagus  3 ulcers in the stomach; the lesion was ablated with argon plasma coagulation; placed 2 clips unsuccessfully. Bleeding noted to first ulcer apc done  Performed multiple forceps biopsies in the stomach  Performed multiple forceps biopsies in the esophagus  Erythematous and ulcerated mucosa in the fundus of the stomach and antrum; performed cold forceps  biopsy  Avoid nonsteroidal anti-inflammatories, continue IV PPI for 72 hours and then oral PPI for at least 8 weeks. Follow up pathology results and treat H pylori if present.    1/28  No further episodes of GI bleed. Not nauseated or vomiting.Hemoglobin stable at 8.   Will continue iv PPI to complete total duration of 72 hours. Will monitor H/H.  Will follow up pathology results and treat H pylori if present.    1/29  No episodes of melena/ bloody stools. Hemoglobin steady at 7.8.  Iv Protonix infusion 72 hours will be completed by today.  EGD biopsy showed gastritis without H pylori infection and Molina's esophagus with inflammation.   Likely d/c tomorrow morning    B12 deficiency anemia  Megaloblastic anemia -raised MCV, low vit B12 levels(179) in addition to acute blood loss anemia due to upper GI bleed. Most recent hemoglobin and hematocrit are listed below.  Recent Labs     01/27/25  1758 01/28/25  0000 01/28/25  0620   HGB 8.1* 7.8* 8.0*   HCT 24.3* 22.5* 23.2*     Plan  Continue with  cyanocobalamin inj 1000 mcg once daily  Monitor CBC and vit B12 levels    Hyponatremia  Hyponatremia is likely due to Dehydration/hypovolemia or SIADH secondary to nausea and vomiting. The patient's most recent sodium results are listed below.  Recent Labs     01/26/25  1848 01/28/25  0620 01/29/25  0513   * 131* 130*       Plan  - Correct the sodium by 4-6mEq in 24 hours.   - Obtain the following studies: Urine sodium and urine osmolality.  - Will treat the hyponatremia with IV fluids  - Monitor sodium Daily.   - Patient hyponatremia is stable    1/28  Improving          Anxiety  Continue home Buspar 5 mg po tid      Tobacco dependence  Patient has a 6 pack year history of smoking. Dangers of cigarette smoking were reviewed with patient in detail. Patient was Counseled for 3-10 minutes. Nicotine replacement options were discussed. Nicotine replacement was discussed-  will not prescribe at this time.     Alcohol  dependence  Patient reports a six year history of daily alcohol consumption amounting to 3-5 glasses of wine. Last drink was yesterday. CIWA score 5.     Social History     Substance and Sexual Activity   Alcohol Use Yes    Alcohol/week: 4.0 standard drinks of alcohol    Types: 4 Standard drinks or equivalent per week     Plan   - Banana bag   - IVF 0.9% NS at 200 ml/hr  - UDS    1/28  Low CIWA score  Counseled on the need to quit alcohol.  Patient verbalized understanding      Essential hypertension  Patient's blood pressure range in the last 24 hours was: BP  Min: 129/66  Max: 155/54.The patient's inpatient anti-hypertensive regimen is listed below:  Home Antihypertensives  Norvasc 10 mg po daily   Valsartan 160 mg po daily    Plan  - Will hold home medication due to recent GI with low BP  - Will restart after bleeding resolves and bp rebounds           VTE Risk Mitigation (From admission, onward)           Ordered     Place MELISSA hose  Until discontinued         01/26/25 1923     IP VTE LOW RISK PATIENT  Once         01/26/25 1923                    Discharge Planning   NAYELI:      Code Status: Full Code   Medical Readiness for Discharge Date:   Discharge Plan A: Home with family                        Leonides Rodriguez MD  Department of Hospital Medicine   [facility]

## 2025-01-29 NOTE — ASSESSMENT & PLAN NOTE
Patient's hemorrhage is due to gastrointestinal bleed, patient does not have a propensity for bleeding. No prior history of GI bleed, anticoagulant used. However, the patient does take Asprin 325 x 2 approximately 1-2 times daily for pain. She reports that she trip over her dog and fell injuring her arm and face in the process. Patients most recent Hgb, Hct, platelets, and INR are listed below. Patient received a 1 L bolus of NS in the ED for low bp and tachycardia which is likely secondary to volume loss. Give Protonix 80 mg IV x 1. Rockall score 2; 5.6% risk of mortality prior to endoscopy.     Recent Labs     01/26/25  1848 01/27/25  0003 01/27/25  0632 01/28/25  0620 01/28/25  1559 01/29/25  0512   HGB 9.5* 7.0*   < > 8.0* 7.8* 7.8*   HCT 27.2* 19.7*   < > 23.2* 22.7* 23.4*     --   --   --   --  195   INR  --  1.02  --   --   --   --     < > = values in this interval not displayed.       Plan  - Will trend hemoglobin/hematocrit Every 6 hours  - Will monitor and correct any coagulation defects  - Will transfuse if Hgb is <7g/dl (<8g/dl in cases of active ACS) or if patient has rapid bleeding leading to hemodynamic instability  - Will hold aspirin due to recent bleed  - Type & screen  - PT/PTT  - Iron/TIBC/Ferritin; evaluate for iron deficiency anemia  - Protonix drip   - 0.9% NS IV at 200 ml/hr  - Will not place NGT due to lack of active bleeding and patient being clinically stable  - NPO after midnight except for sips with meds  - Supplemental 02 prn   - Consult GI; evaluate for EGD    1/27  GI consulted. EGD performed. Per GI:  Molina's esophagus  3 ulcers in the stomach; the lesion was ablated with argon plasma coagulation; placed 2 clips unsuccessfully. Bleeding noted to first ulcer apc done  Performed multiple forceps biopsies in the stomach  Performed multiple forceps biopsies in the esophagus  Erythematous and ulcerated mucosa in the fundus of the stomach and antrum; performed cold forceps  biopsy  Avoid nonsteroidal anti-inflammatories, continue IV PPI for 72 hours and then oral PPI for at least 8 weeks. Follow up pathology results and treat H pylori if present.    1/28  No further episodes of GI bleed. Not nauseated or vomiting.Hemoglobin stable at 8.   Will continue iv PPI to complete total duration of 72 hours. Will monitor H/H.  Will follow up pathology results and treat H pylori if present.    1/29  No episodes of melena/ bloody stools. Hemoglobin steady at 7.8.  Iv Protonix infusion 72 hours will be completed by today.  EGD biopsy showed gastritis without H pylori infection and Molina's esophagus with inflammation.   Likely d/c tomorrow morning

## 2025-01-29 NOTE — ASSESSMENT & PLAN NOTE
Patient's blood pressure range in the last 24 hours was: BP  Min: 129/66  Max: 155/54.The patient's inpatient anti-hypertensive regimen is listed below:  Home Antihypertensives  Norvasc 10 mg po daily   Valsartan 160 mg po daily    Plan  - Will hold home medication due to recent GI with low BP  - Will restart after bleeding resolves and bp rebounds

## 2025-01-29 NOTE — ASSESSMENT & PLAN NOTE
Patient reports a six year history of daily alcohol consumption amounting to 3-5 glasses of wine. Last drink was yesterday. CIWA score 5.     Social History     Substance and Sexual Activity   Alcohol Use Yes    Alcohol/week: 4.0 standard drinks of alcohol    Types: 4 Standard drinks or equivalent per week     Plan   - Banana bag   - IVF 0.9% NS at 200 ml/hr  - UDS    1/28  Low CIWA score  Counseled on the need to quit alcohol.  Patient verbalized understanding

## 2025-01-29 NOTE — SUBJECTIVE & OBJECTIVE
Interval History: No acute events overnight. No episodes of melena/ bloody stools. Hemoglobin steady at 7.8.Not nauseated or vomiting. Iv Protonix infusion 72 hours will be completed by today evening. EGD biopsy showed gastritis without H pylori infection and Molina's esophagus with inflammation.  Likely discharge tomorrow morning.    Review of Systems   Constitutional:  Negative for activity change, appetite change, diaphoresis, fatigue, fever and unexpected weight change.   HENT:  Negative for nosebleeds and sore throat.    Eyes:  Negative for pain and visual disturbance.   Respiratory:  Negative for shortness of breath and wheezing.    Cardiovascular:  Negative for chest pain and palpitations.   Gastrointestinal:  Negative for constipation.   Endocrine: Negative for cold intolerance, polydipsia, polyphagia and polyuria.   Genitourinary:  Negative for dysuria, frequency and urgency.   Musculoskeletal:  Negative for arthralgias, back pain and neck pain.   Skin:  Negative for rash and wound.   Allergic/Immunologic: Negative for environmental allergies and food allergies.   Neurological:  Negative for syncope, light-headedness, numbness and headaches.   Hematological:  Does not bruise/bleed easily.   Psychiatric/Behavioral:  Negative for confusion.      Objective:     Vital Signs (Most Recent):  Temp: 98.4 °F (36.9 °C) (01/29/25 1118)  Pulse: 65 (01/29/25 1118)  Resp: 18 (01/29/25 1118)  BP: (!) 155/54 (01/29/25 1118)  SpO2: 99 % (01/29/25 1118) Vital Signs (24h Range):  Temp:  [97.7 °F (36.5 °C)-99 °F (37.2 °C)] 98.4 °F (36.9 °C)  Pulse:  [65-87] 65  Resp:  [16-18] 18  SpO2:  [94 %-99 %] 99 %  BP: (129-155)/(54-85) 155/54     Weight: 59 kg (130 lb 1.1 oz)  Body mass index is 23.04 kg/m².  No intake or output data in the 24 hours ending 01/29/25 1134      Physical Exam  Vitals and nursing note reviewed.   Constitutional:       General: She is not in acute distress.     Appearance: She is not ill-appearing.   HENT:       Right Ear: Tympanic membrane normal.      Left Ear: Tympanic membrane normal.      Nose: Nose normal.      Mouth/Throat:      Mouth: Mucous membranes are moist.      Pharynx: Oropharynx is clear.   Eyes:      Conjunctiva/sclera: Conjunctivae normal.      Pupils: Pupils are equal, round, and reactive to light.   Neck:      Vascular: No carotid bruit.   Cardiovascular:      Rate and Rhythm: Regular rhythm.      Pulses: Normal pulses.      Heart sounds: Normal heart sounds. No murmur heard.     No friction rub. No gallop.   Pulmonary:      Effort: Pulmonary effort is normal.      Breath sounds: Normal breath sounds. No wheezing, rhonchi or rales.   Abdominal:      General: Bowel sounds are normal.      Tenderness: There is no guarding or rebound.   Musculoskeletal:      Cervical back: Normal range of motion and neck supple.      Right lower leg: No edema.      Left lower leg: No edema.   Skin:     General: Skin is warm and dry.      Capillary Refill: Capillary refill takes less than 2 seconds.   Neurological:      General: No focal deficit present.      Mental Status: She is oriented to person, place, and time.   Psychiatric:         Mood and Affect: Mood normal.         Behavior: Behavior normal.             Significant Labs: All pertinent labs within the past 24 hours have been reviewed.    Significant Imaging: I have reviewed all pertinent imaging results/findings within the past 24 hours.

## 2025-01-30 VITALS
HEIGHT: 63 IN | RESPIRATION RATE: 16 BRPM | DIASTOLIC BLOOD PRESSURE: 76 MMHG | SYSTOLIC BLOOD PRESSURE: 136 MMHG | WEIGHT: 130.06 LBS | TEMPERATURE: 99 F | HEART RATE: 91 BPM | OXYGEN SATURATION: 97 % | BODY MASS INDEX: 23.04 KG/M2

## 2025-01-30 LAB
ANION GAP SERPL CALCULATED.3IONS-SCNC: 11 MMOL/L (ref 7–16)
BASOPHILS # BLD AUTO: 0.04 K/UL (ref 0–0.2)
BASOPHILS NFR BLD AUTO: 0.9 % (ref 0–1)
BUN SERPL-MCNC: 6 MG/DL (ref 10–20)
BUN/CREAT SERPL: 9 (ref 6–20)
CALCIUM SERPL-MCNC: 8.9 MG/DL (ref 8.4–10.2)
CHLORIDE SERPL-SCNC: 101 MMOL/L (ref 98–107)
CO2 SERPL-SCNC: 24 MMOL/L (ref 22–29)
CREAT SERPL-MCNC: 0.66 MG/DL (ref 0.55–1.02)
DIFFERENTIAL METHOD BLD: ABNORMAL
EGFR (NO RACE VARIABLE) (RUSH/TITUS): 101 ML/MIN/1.73M2
EOSINOPHIL # BLD AUTO: 0.12 K/UL (ref 0–0.5)
EOSINOPHIL NFR BLD AUTO: 2.8 % (ref 1–4)
ERYTHROCYTE [DISTWIDTH] IN BLOOD BY AUTOMATED COUNT: 13.2 % (ref 11.5–14.5)
GLUCOSE SERPL-MCNC: 93 MG/DL (ref 74–100)
HCT VFR BLD AUTO: 24.2 % (ref 38–47)
HGB BLD-MCNC: 8.2 G/DL (ref 12–16)
IMM GRANULOCYTES # BLD AUTO: 0.05 K/UL (ref 0–0.04)
IMM GRANULOCYTES NFR BLD: 1.2 % (ref 0–0.4)
LYMPHOCYTES # BLD AUTO: 1.27 K/UL (ref 1–4.8)
LYMPHOCYTES NFR BLD AUTO: 29.7 % (ref 27–41)
MCH RBC QN AUTO: 33.6 PG (ref 27–31)
MCHC RBC AUTO-ENTMCNC: 33.9 G/DL (ref 32–36)
MCV RBC AUTO: 99.2 FL (ref 80–96)
MONOCYTES # BLD AUTO: 1.61 K/UL (ref 0–0.8)
MONOCYTES NFR BLD AUTO: 37.6 % (ref 2–6)
MPC BLD CALC-MCNC: 10.3 FL (ref 9.4–12.4)
NEUTROPHILS # BLD AUTO: 1.19 K/UL (ref 1.8–7.7)
NEUTROPHILS NFR BLD AUTO: 27.8 % (ref 53–65)
NRBC # BLD AUTO: 0 X10E3/UL
NRBC, AUTO (.00): 0 %
PLATELET # BLD AUTO: 246 K/UL (ref 150–400)
POTASSIUM SERPL-SCNC: 4.7 MMOL/L (ref 3.5–5.1)
RBC # BLD AUTO: 2.44 M/UL (ref 4.2–5.4)
SODIUM SERPL-SCNC: 131 MMOL/L (ref 136–145)
WBC # BLD AUTO: 4.28 K/UL (ref 4.5–11)

## 2025-01-30 PROCEDURE — 25000003 PHARM REV CODE 250: Performed by: HOSPITALIST

## 2025-01-30 PROCEDURE — 25000003 PHARM REV CODE 250: Performed by: INTERNAL MEDICINE

## 2025-01-30 PROCEDURE — 63600175 PHARM REV CODE 636 W HCPCS: Performed by: HOSPITALIST

## 2025-01-30 PROCEDURE — 36415 COLL VENOUS BLD VENIPUNCTURE: CPT

## 2025-01-30 PROCEDURE — 99239 HOSP IP/OBS DSCHRG MGMT >30: CPT | Mod: GC,,, | Performed by: FAMILY MEDICINE

## 2025-01-30 PROCEDURE — 25000003 PHARM REV CODE 250

## 2025-01-30 PROCEDURE — 80048 BASIC METABOLIC PNL TOTAL CA: CPT

## 2025-01-30 PROCEDURE — 85025 COMPLETE CBC W/AUTO DIFF WBC: CPT

## 2025-01-30 RX ORDER — LANOLIN ALCOHOL/MO/W.PET/CERES
1000 CREAM (GRAM) TOPICAL DAILY
Qty: 30 TABLET | Refills: 0 | Status: SHIPPED | OUTPATIENT
Start: 2025-01-30 | End: 2025-03-01

## 2025-01-30 RX ORDER — PANTOPRAZOLE SODIUM 40 MG/1
40 TABLET, DELAYED RELEASE ORAL
Status: DISCONTINUED | OUTPATIENT
Start: 2025-01-30 | End: 2025-01-30 | Stop reason: HOSPADM

## 2025-01-30 RX ORDER — PANTOPRAZOLE SODIUM 40 MG/1
40 TABLET, DELAYED RELEASE ORAL
Qty: 60 TABLET | Refills: 1 | Status: SHIPPED | OUTPATIENT
Start: 2025-01-30 | End: 2025-03-31

## 2025-01-30 RX ORDER — FERROUS SULFATE 325(65) MG
325 TABLET, DELAYED RELEASE (ENTERIC COATED) ORAL DAILY
Qty: 30 TABLET | Refills: 0 | Status: SHIPPED | OUTPATIENT
Start: 2025-01-30 | End: 2025-03-01

## 2025-01-30 RX ADMIN — BUSPIRONE HYDROCHLORIDE 5 MG: 5 TABLET ORAL at 08:01

## 2025-01-30 RX ADMIN — FERROUS SULFATE TAB 325 MG (65 MG ELEMENTAL FE) 1 EACH: 325 (65 FE) TAB at 08:01

## 2025-01-30 RX ADMIN — PANTOPRAZOLE SODIUM 40 MG: 40 TABLET, DELAYED RELEASE ORAL at 07:01

## 2025-01-30 RX ADMIN — CYANOCOBALAMIN 1000 MCG: 1000 INJECTION INTRAMUSCULAR; SUBCUTANEOUS at 08:01

## 2025-01-30 NOTE — ASSESSMENT & PLAN NOTE
Hyponatremia is likely due to Dehydration/hypovolemia or SIADH secondary to nausea and vomiting. The patient's most recent sodium results are listed below.  Recent Labs     01/28/25  0620 01/29/25  0513 01/30/25  0529   * 130* 131*       Plan  - Correct the sodium by 4-6mEq in 24 hours.   - Obtain the following studies: Urine sodium and urine osmolality.  - Will treat the hyponatremia with IV fluids  - Monitor sodium Daily.   - Patient hyponatremia is stable    1/28  Improving

## 2025-01-30 NOTE — PLAN OF CARE
Problem: Gastrointestinal Bleeding  Goal: Optimal Coping with Acute Illness  Outcome: Met  Goal: Hemostasis  Outcome: Met     Problem: Adult Inpatient Plan of Care  Goal: Plan of Care Review  Outcome: Met  Goal: Patient-Specific Goal (Individualized)  Outcome: Met  Goal: Absence of Hospital-Acquired Illness or Injury  Outcome: Met  Goal: Optimal Comfort and Wellbeing  Outcome: Met  Goal: Readiness for Transition of Care  Outcome: Met     Problem: Fall Injury Risk  Goal: Absence of Fall and Fall-Related Injury  Outcome: Met

## 2025-01-30 NOTE — DISCHARGE SUMMARY
[facility]  [facility]  Discharge Summary      Patient Name: Rosetta Simental  MRN: [id number]  SAM: [id number]  Patient Class: IP- Inpatient  Admission Date: 1/26/2025  Hospital Length of Stay: 4 days  Discharge Date and Time:  01/30/2025 10:53 AM  Attending Physician: Jer Blas Jr., MD   Discharging Provider: Leonides Rodriguez MD  Primary Care Provider: Zee Corrigan FNP    Primary Care Team: Networked reference to record PCT     HPI:   Patient is a 59 year old female presenting to [facility] ED with complaints of abdominal cramps, nausea, and vomiting x 3 that start lasts night after dinner. She also reports difficulty swallowing leading to gagging. Overnight her symptoms progress for which she took Pepto Bismal. From 9pm to 5am, she had recurrent bowel movements described as black and sticky, but did not notice any stephenie blood in her stool. This morning around 6am she started vomiting brownish-red liquid with small chunks for a total of three times today. Denies any prior episodes of hemoptysis, melena, or hematochezia. No history of GI disease including gastric ulcer, gastritis, esophagitis, esophageal varices, or cancer. Patient reports traveling out of the country to [place] last month. She is a current smoker and uses alcohol daily. Patient also reports daily use of aspirin 325 mg x 3 daily.      * No surgery found *      Hospital Course:     1/27   1 unit of PRBC transfused. Post transfusion H/H 8.6/25.8. Underwent EGD today. EGD notes Molina's esophagus, 3 ulcers in the stomach; tissue was ablated with argon plasma coagulation; placed 2 clips unsuccessfully. Per GI to continue iv PPI for total 72 hours. Will f/u with pathology and treat H.pylori if present. After EGD patient wanted to leave AMA.Counseled the patient about the GI recommendations and the need for iv PPI as well as her repeat H/H needed to be stable before d/c. She verbalized  understanding and knows the risk of leaving AMA.     1/28  No further episodes of hematochezia. Not nauseated or vomiting.Hemoglobin stable at 8. Will continue iv PPI to complete total duration of 72 hours. Will monitor H/H. Cyanocobalamin  1000 mcg inj daily for B12 def anemia. Histopath report awaited.    1/29  No episodes of melena/ bloody stools. Hemoglobin steady at 7.8. Iv Protonix infusion 72 hours will be completed by today.EGD biopsy showed gastritis without H pylori infection and Molina's esophagus with inflammation.  Likely discharge tomorrow morning.    1/30  Patient is to be discharged today. Hemoglobin steady at 8.2. No bloody stools. Protonix 40 mg oral BID to continue for 8 weeks. Continue oral vit B12 and iron tablets. She is to follow up with her PCP in 1 week and GI physician in 2 weeks time. She is counseled to quit drinking alcohol and smoking cigarettes. PCP to follow up on this      Goals of Care Treatment Preferences:  Code Status: Full Code      SDOH Screening:  The patient was screened for utility difficulties, food insecurity, transport difficulties, housing insecurity, and interpersonal safety and there were no concerns identified this admission.     Consults:   Consults (From admission, onward)          Status Ordering Provider     Inpatient consult to Gastroenterology  Once        Provider:  Duy Monteiro MD    Completed HEATHER GREEN     Inpatient consult to Social Work/Case Management  Once        Provider:  (Not yet assigned)    Completed HEATHER GREEN            * UGIB (upper gastrointestinal bleed)  Patient's hemorrhage is due to gastrointestinal bleed, patient does not have a propensity for bleeding. No prior history of GI bleed, anticoagulant used. However, the patient does take Asprin 325 x 2 approximately 1-2 times daily for pain. She reports that she trip over her dog and fell injuring her arm and face in the process. Patients most recent Hgb, Hct,  platelets, and INR are listed below. Patient received a 1 L bolus of NS in the ED for low bp and tachycardia which is likely secondary to volume loss. Give Protonix 80 mg IV x 1. Rockall score 2; 5.6% risk of mortality prior to endoscopy.     Recent Labs     01/29/25  0512 01/29/25  1606 01/30/25  0529   HGB 7.8* 8.1* 8.2*   HCT 23.4* 23.5* 24.2*     --  246       Plan  - Will trend hemoglobin/hematocrit Every 6 hours  - Will monitor and correct any coagulation defects  - Will transfuse if Hgb is <7g/dl (<8g/dl in cases of active ACS) or if patient has rapid bleeding leading to hemodynamic instability  - Will hold aspirin due to recent bleed  - Type & screen  - PT/PTT  - Iron/TIBC/Ferritin; evaluate for iron deficiency anemia  - Protonix drip   - 0.9% NS IV at 200 ml/hr  - Will not place NGT due to lack of active bleeding and patient being clinically stable  - NPO after midnight except for sips with meds  - Supplemental 02 prn   - Consult GI; evaluate for EGD    1/27  GI consulted. EGD performed. Per GI:  Molina's esophagus  3 ulcers in the stomach; the lesion was ablated with argon plasma coagulation; placed 2 clips unsuccessfully. Bleeding noted to first ulcer apc done  Performed multiple forceps biopsies in the stomach  Performed multiple forceps biopsies in the esophagus  Erythematous and ulcerated mucosa in the fundus of the stomach and antrum; performed cold forceps biopsy  Avoid nonsteroidal anti-inflammatories, continue IV PPI for 72 hours and then oral PPI for at least 8 weeks. Follow up pathology results and treat H pylori if present.    1/28  No further episodes of GI bleed. Not nauseated or vomiting.Hemoglobin stable at 8.   Will continue iv PPI to complete total duration of 72 hours. Will monitor H/H.  Will follow up pathology results and treat H pylori if present.    1/29  No episodes of melena/ bloody stools. Hemoglobin steady at 7.8.  Iv Protonix infusion 72 hours will be completed by  today.  EGD biopsy showed gastritis without H pylori infection and Molina's esophagus with inflammation.   Likely d/c tomorrow morning    1/30  Patient is to be discharged today. Hemoglobin steady at 8.2. No bloody stools.  Protonix 40 mg oral BID to continue for 8 weeks. Continue oral vit B12 and iron tablets.   She is to follow up with her PCP in 1 week and GI physician in 2 weeks time.     B12 deficiency anemia  Megaloblastic anemia -raised MCV, low vit B12 levels(179) in addition to acute blood loss anemia due to upper GI bleed. Most recent hemoglobin and hematocrit are listed below.  Recent Labs     01/29/25  0512 01/29/25  1606 01/30/25  0529   HGB 7.8* 8.1* 8.2*   HCT 23.4* 23.5* 24.2*       Plan  Continue with  cyanocobalamin inj 1000 mcg once daily  Monitor CBC and vit B12 levels    1/30  Continue with oral cyanocobalamin 1000 mcg daily at d/c  PCP to follow up    Hyponatremia  Hyponatremia is likely due to Dehydration/hypovolemia or SIADH secondary to nausea and vomiting. The patient's most recent sodium results are listed below.  Recent Labs     01/28/25  0620 01/29/25  0513 01/30/25  0529   * 130* 131*       Plan  - Correct the sodium by 4-6mEq in 24 hours.   - Obtain the following studies: Urine sodium and urine osmolality.  - Will treat the hyponatremia with IV fluids  - Monitor sodium Daily.   - Patient hyponatremia is stable    1/28  Improving              Anxiety  Continue home Buspar 5 mg po tid      Tobacco dependence  Patient has a 6 pack year history of smoking. Dangers of cigarette smoking were reviewed with patient in detail. Patient was Counseled for 3-10 minutes. Nicotine replacement options were discussed. Nicotine replacement was discussed-  will not prescribe at this time.     Alcohol dependence  Patient reports a six year history of daily alcohol consumption amounting to 3-5 glasses of wine. Last drink was yesterday. CIWA score 5.     Social History     Substance and Sexual  Activity   Alcohol Use Yes    Alcohol/week: 4.0 standard drinks of alcohol    Types: 4 Standard drinks or equivalent per week     Plan   - Banana bag   - IVF 0.9% NS at 200 ml/hr  - UDS    1/28  Low CIWA score  Counseled on the need to quit alcohol.  Patient verbalized understanding    1/30  She is counseled to quit drinking alcohol and smoking cigarettes. PCP to follow up on this       Essential hypertension  Patient's blood pressure range in the last 24 hours was: BP  Min: 136/76  Max: 157/78.The patient's inpatient anti-hypertensive regimen is listed below:  Home Antihypertensives  Norvasc 10 mg po daily   Valsartan 160 mg po daily    Plan  - Will hold home medication due to recent GI with low BP  - Will restart after bleeding resolves and bp rebounds     1/30  Continue home BP meds at d/c          Final Active Diagnoses:    Diagnosis Date Noted POA    PRINCIPAL PROBLEM:  UGIB (upper gastrointestinal bleed) [K92.2] 01/26/2025 Yes    B12 deficiency anemia [D51.9] 01/28/2025 Yes    Acute blood loss anemia [D62] 01/27/2025 Unknown    Pernicious anemia [D51.0] 01/27/2025 Unknown    Acute superficial gastritis without hemorrhage [K29.00] 01/27/2025 Unknown    Gastric ulcer [K25.9] 01/27/2025 Unknown    Molina's esophagus without dysplasia [K22.70] 01/27/2025 Unknown    Anxiety [F41.9] 01/26/2025 Yes    Hyponatremia [E87.1] 01/26/2025 Yes    Alcohol dependence [F10.20]  Yes    Tobacco dependence [F17.200]  Yes    Essential hypertension [I10] 04/27/2021 Yes      Problems Resolved During this Admission:       Discharged Condition: stable    Disposition: Home or Self Care    Follow Up:   Follow-up Information       Zee Corrigan FNP. Schedule an appointment as soon as possible for a visit in 1 week(s).    Specialty: Family Medicine  Why: [facility] d/c follow up  Contact information:  [address] [facility] [place] [address] [phone]               Duy Monteiro MD. Schedule an appointment as soon as  possible for a visit in 2 week(s).    Specialty: Gastroenterology  Why: Hospital d/c follow up for UGI bleed  Contact information:  [address] [place] [address]  [facility] [place] [address]  [phone]                           Patient Instructions:      Diet Adult Regular     Notify your health care provider if you experience any of the following:  persistent dizziness, light-headedness, or visual disturbances     Notify your health care provider if you experience any of the following:  persistent nausea and vomiting or diarrhea     Reason for not Ordering Smoking Cessation Referral     Order Specific Question Answer Comments   Reason for not ordering: Patient refused      Reason for not Prescribing Nicotine Replacement     Order Specific Question Answer Comments   Reason for not Prescribing: Patient refused      Activity as tolerated       Significant Diagnostic Studies: N/A    Pending Diagnostic Studies:       Procedure Component Value Units Date/Time    Hemoglobin and Hematocrit [[id number]]     Order Status: Sent Lab Status: No result     Specimen: Blood            Medications:  Reconciled Home Medications:      Medication List        START taking these medications      cyanocobalamin 1000 MCG tablet  Commonly known as: VITAMIN B-12  Take 1 tablet (1,000 mcg total) by mouth once daily.     ferrous sulfate 325 (65 FE) MG EC tablet  Take 1 tablet (325 mg total) by mouth once daily.     pantoprazole 40 MG tablet  Commonly known as: PROTONIX  Take 1 tablet (40 mg total) by mouth 2 (two) times daily before meals.            CONTINUE taking these medications      amLODIPine 10 MG tablet  Commonly known as: NORVASC  Take 1 tablet (10 mg total) by mouth once daily.     busPIRone 10 MG tablet  Commonly known as: BUSPAR  Take 1 tablet by mouth 3 times daily for anxiety - (MAY CAUSE DROWSINESS)     valsartan 160 MG tablet  Commonly known as: DIOVAN  Take 1 tablet (160 mg total) by mouth once daily.               Indwelling Lines/Drains at time of discharge:   Lines/Drains/Airways       None                   Time spent on the discharge of patient: 45 minutes         Leonides Rodriguez MD  Department of Hospital Medicine  [facility]

## 2025-01-30 NOTE — ASSESSMENT & PLAN NOTE
Patient's hemorrhage is due to gastrointestinal bleed, patient does not have a propensity for bleeding. No prior history of GI bleed, anticoagulant used. However, the patient does take Asprin 325 x 2 approximately 1-2 times daily for pain. She reports that she trip over her dog and fell injuring her arm and face in the process. Patients most recent Hgb, Hct, platelets, and INR are listed below. Patient received a 1 L bolus of NS in the ED for low bp and tachycardia which is likely secondary to volume loss. Give Protonix 80 mg IV x 1. Rockall score 2; 5.6% risk of mortality prior to endoscopy.     Recent Labs     01/29/25  0512 01/29/25  1606 01/30/25  0529   HGB 7.8* 8.1* 8.2*   HCT 23.4* 23.5* 24.2*     --  246       Plan  - Will trend hemoglobin/hematocrit Every 6 hours  - Will monitor and correct any coagulation defects  - Will transfuse if Hgb is <7g/dl (<8g/dl in cases of active ACS) or if patient has rapid bleeding leading to hemodynamic instability  - Will hold aspirin due to recent bleed  - Type & screen  - PT/PTT  - Iron/TIBC/Ferritin; evaluate for iron deficiency anemia  - Protonix drip   - 0.9% NS IV at 200 ml/hr  - Will not place NGT due to lack of active bleeding and patient being clinically stable  - NPO after midnight except for sips with meds  - Supplemental 02 prn   - Consult GI; evaluate for EGD    1/27  GI consulted. EGD performed. Per GI:  Molina's esophagus  3 ulcers in the stomach; the lesion was ablated with argon plasma coagulation; placed 2 clips unsuccessfully. Bleeding noted to first ulcer apc done  Performed multiple forceps biopsies in the stomach  Performed multiple forceps biopsies in the esophagus  Erythematous and ulcerated mucosa in the fundus of the stomach and antrum; performed cold forceps biopsy  Avoid nonsteroidal anti-inflammatories, continue IV PPI for 72 hours and then oral PPI for at least 8 weeks. Follow up pathology results and treat H pylori if  present.    1/28  No further episodes of GI bleed. Not nauseated or vomiting.Hemoglobin stable at 8.   Will continue iv PPI to complete total duration of 72 hours. Will monitor H/H.  Will follow up pathology results and treat H pylori if present.    1/29  No episodes of melena/ bloody stools. Hemoglobin steady at 7.8.  Iv Protonix infusion 72 hours will be completed by today.  EGD biopsy showed gastritis without H pylori infection and Molina's esophagus with inflammation.   Likely d/c tomorrow morning    1/30  Patient is to be discharged today. Hemoglobin steady at 8.2. No bloody stools.  Protonix 40 mg oral BID to continue for 8 weeks. Continue oral vit B12 and iron tablets.   She is to follow up with her PCP in 1 week and GI physician in 2 weeks time.

## 2025-01-30 NOTE — NURSING
Went over discharge instructions with patient and answered questions. Ivs and heart monitor removed. Patient getting dressed and will call out when ready.  in room.

## 2025-01-30 NOTE — ASSESSMENT & PLAN NOTE
Patient's blood pressure range in the last 24 hours was: BP  Min: 136/76  Max: 157/78.The patient's inpatient anti-hypertensive regimen is listed below:  Home Antihypertensives  Norvasc 10 mg po daily   Valsartan 160 mg po daily    Plan  - Will hold home medication due to recent GI with low BP  - Will restart after bleeding resolves and bp rebounds     1/30  Continue home BP meds at d/c

## 2025-01-30 NOTE — ASSESSMENT & PLAN NOTE
Megaloblastic anemia -raised MCV, low vit B12 levels(179) in addition to acute blood loss anemia due to upper GI bleed. Most recent hemoglobin and hematocrit are listed below.  Recent Labs     01/29/25  0512 01/29/25  1606 01/30/25  0529   HGB 7.8* 8.1* 8.2*   HCT 23.4* 23.5* 24.2*       Plan  Continue with  cyanocobalamin inj 1000 mcg once daily  Monitor CBC and vit B12 levels    1/30  Continue with oral cyanocobalamin 1000 mcg daily at d/c  PCP to follow up

## 2025-01-30 NOTE — ASSESSMENT & PLAN NOTE
Patient reports a six year history of daily alcohol consumption amounting to 3-5 glasses of wine. Last drink was yesterday. CIWA score 5.     Social History     Substance and Sexual Activity   Alcohol Use Yes    Alcohol/week: 4.0 standard drinks of alcohol    Types: 4 Standard drinks or equivalent per week     Plan   - Banana bag   - IVF 0.9% NS at 200 ml/hr  - UDS    1/28  Low CIWA score  Counseled on the need to quit alcohol.  Patient verbalized understanding    1/30  She is counseled to quit drinking alcohol and smoking cigarettes. PCP to follow up on this

## 2025-02-03 ENCOUNTER — OFFICE VISIT (OUTPATIENT)
Dept: FAMILY MEDICINE | Facility: CLINIC | Age: 60
End: 2025-02-03
Payer: OTHER GOVERNMENT

## 2025-02-03 VITALS
RESPIRATION RATE: 17 BRPM | OXYGEN SATURATION: 99 % | TEMPERATURE: 98 F | BODY MASS INDEX: 23.04 KG/M2 | SYSTOLIC BLOOD PRESSURE: 178 MMHG | HEIGHT: 63 IN | HEART RATE: 65 BPM | WEIGHT: 130 LBS | DIASTOLIC BLOOD PRESSURE: 83 MMHG

## 2025-02-03 DIAGNOSIS — K22.70 BARRETT'S ESOPHAGUS WITHOUT DYSPLASIA: ICD-10-CM

## 2025-02-03 DIAGNOSIS — D62 ACUTE BLOOD LOSS ANEMIA: ICD-10-CM

## 2025-02-03 DIAGNOSIS — Z09 HOSPITAL DISCHARGE FOLLOW-UP: Primary | ICD-10-CM

## 2025-02-03 DIAGNOSIS — K25.9 GASTRIC ULCER, UNSPECIFIED CHRONICITY, UNSPECIFIED WHETHER GASTRIC ULCER HEMORRHAGE OR PERFORATION PRESENT: ICD-10-CM

## 2025-02-03 DIAGNOSIS — F41.9 ANXIETY: ICD-10-CM

## 2025-02-03 LAB
BASOPHILS # BLD AUTO: 0.04 K/UL (ref 0–0.2)
BASOPHILS NFR BLD AUTO: 0.5 % (ref 0–1)
DIFFERENTIAL METHOD BLD: ABNORMAL
EOSINOPHIL # BLD AUTO: 0.08 K/UL (ref 0–0.5)
EOSINOPHIL NFR BLD AUTO: 1 % (ref 1–4)
ERYTHROCYTE [DISTWIDTH] IN BLOOD BY AUTOMATED COUNT: 13.6 % (ref 11.5–14.5)
HCT VFR BLD AUTO: 31 % (ref 38–47)
HGB BLD-MCNC: 10 G/DL (ref 12–16)
IMM GRANULOCYTES # BLD AUTO: 0.13 K/UL (ref 0–0.04)
IMM GRANULOCYTES NFR BLD: 1.6 % (ref 0–0.4)
LYMPHOCYTES # BLD AUTO: 1.39 K/UL (ref 1–4.8)
LYMPHOCYTES NFR BLD AUTO: 17.3 % (ref 27–41)
MCH RBC QN AUTO: 33.6 PG (ref 27–31)
MCHC RBC AUTO-ENTMCNC: 32.3 G/DL (ref 32–36)
MCV RBC AUTO: 104 FL (ref 80–96)
MONOCYTES # BLD AUTO: 1.3 K/UL (ref 0–0.8)
MONOCYTES NFR BLD AUTO: 16.2 % (ref 2–6)
MPC BLD CALC-MCNC: 9.4 FL (ref 9.4–12.4)
NEUTROPHILS # BLD AUTO: 5.08 K/UL (ref 1.8–7.7)
NEUTROPHILS NFR BLD AUTO: 63.4 % (ref 53–65)
NRBC # BLD AUTO: 0 X10E3/UL
NRBC, AUTO (.00): 0 %
PLATELET # BLD AUTO: 608 K/UL (ref 150–400)
RBC # BLD AUTO: 2.98 M/UL (ref 4.2–5.4)
WBC # BLD AUTO: 8.02 K/UL (ref 4.5–11)

## 2025-02-03 PROCEDURE — 99214 OFFICE O/P EST MOD 30 MIN: CPT | Mod: ,,, | Performed by: NURSE PRACTITIONER

## 2025-02-03 PROCEDURE — 85025 COMPLETE CBC W/AUTO DIFF WBC: CPT | Mod: ,,, | Performed by: CLINICAL MEDICAL LABORATORY

## 2025-02-03 RX ORDER — BUSPIRONE HYDROCHLORIDE 15 MG/1
15 TABLET ORAL 3 TIMES DAILY
Qty: 90 TABLET | Refills: 5 | Status: SHIPPED | OUTPATIENT
Start: 2025-02-03

## 2025-02-03 NOTE — PLAN OF CARE
[facility]  Discharge Final Note    Primary Care Provider: Zee Corrigan FNP    Expected Discharge Date: 1/30/2025    Final Discharge Note (most recent)       Final Note - 02/03/25 0838          Final Note    Assessment Type Final Discharge Note     Anticipated Discharge Disposition Home or Self Care                   Pt discharged home   Important Message from Medicare             Contact Info       Zee Corrigan FNP   Specialty: Family Medicine   Relationship: PCP - General    [address] [place] [id number]   Phone: [phone]       Next Steps: Schedule an appointment as soon as possible for a visit in 1 week(s)    Instructions: Hospital d/c follow up; APPT:Feb 3, 2025 at 9:20 am    Lily Will FNP   Specialty: Gastroenterology    [address] [place] [address] - [facility] [place] [address]   Phone: [phone]       Next Steps: Follow up    Instructions: Schedule an appointment as soon as possible for a visit in 2 week(s)  Instructions: Hospital d/c follow up for UGI bleed.  APPT:March 4, 2025 at 8:20 am.

## 2025-02-03 NOTE — PROGRESS NOTES
[facility] Medicine    Chief Complaint      Chief Complaint   Patient presents with    Hospital Follow Up     GI bleed; (was vomiting blood); Was upper gi bleed, had scope and procedure; feeling very anxious; has not vomited blood since the hospital.       History of Present Illness      Rosetta Simental is a 59 y.o. female. She  has a past medical history of Alcohol dependence, Molina esophagus with esophagitis, Essential hypertension (04/27/2021), Peptic ulcer hemorrhage (01/27/2025), and Tobacco dependence., who presents today for hospital f/u- patient was admitted from 1/26/25- 1/30/25 after going to the ER for abd pain, N/V, and bleeding.  She was found to have an Upper GI bleed and was transfused with 1 unit of PRBC.  She was found to have Molina's esophagus and 3 ulcers in her stomach.  Medication list reconciled.     Past Medical History:  Past Medical History:   Diagnosis Date    Alcohol dependence     Molina esophagus with esophagitis     Essential hypertension 04/27/2021    Peptic ulcer hemorrhage 01/27/2025    egd with argon plasma hemostasis    Tobacco dependence        Past Surgical History:   has a past surgical history that includes Leg Surgery.    Social History:  Social History     Tobacco Use    Smoking status: Every Day     Current packs/day: 0.75     Average packs/day: 0.8 packs/day for 20.0 years (15.0 ttl pk-yrs)     Types: Cigarettes     Passive exposure: Current    Smokeless tobacco: Never   Substance Use Topics    Alcohol use: Yes     Alcohol/week: 4.0 standard drinks of alcohol     Types: 4 Standard drinks or equivalent per week    Drug use: Never       I personally reviewed all past medical, surgical, and social.     Review of Systems   Constitutional:  Negative for fatigue.   Eyes:  Negative for visual disturbance.   Respiratory:  Negative for cough and shortness of breath.    Cardiovascular: Negative.    Gastrointestinal:  Positive for constipation. Negative for abdominal pain,  diarrhea, nausea and vomiting.   Genitourinary:  Negative for difficulty urinating.   Musculoskeletal:  Negative for gait problem.   Skin: Negative.    Neurological:  Negative for dizziness, weakness, light-headedness and headaches.   Psychiatric/Behavioral:  The patient is nervous/anxious.         Medications:  Outpatient Encounter Medications as of 2/3/2025   Medication Sig Dispense Refill    amLODIPine (NORVASC) 10 MG tablet Take 1 tablet (10 mg total) by mouth once daily. 90 tablet 1    cyanocobalamin (VITAMIN B-12) 1000 MCG tablet Take 1 tablet (1,000 mcg total) by mouth once daily. 30 tablet 0    ferrous sulfate 325 (65 FE) MG EC tablet Take 1 tablet (325 mg total) by mouth once daily. 30 tablet 0    pantoprazole (PROTONIX) 40 MG tablet Take 1 tablet (40 mg total) by mouth 2 (two) times daily before meals. 60 tablet 1    valsartan (DIOVAN) 160 MG tablet Take 1 tablet (160 mg total) by mouth once daily. 90 tablet 1    [DISCONTINUED] busPIRone (BUSPAR) 10 MG tablet Take 1 tablet by mouth 3 times daily for anxiety - (MAY CAUSE DROWSINESS) 90 tablet 1    busPIRone (BUSPAR) 15 MG tablet Take 1 tablet (15 mg total) by mouth 3 (three) times daily. 90 tablet 5     No facility-administered encounter medications on file as of 2/3/2025.       Allergies:  Review of patient's allergies indicates:  No Known Allergies    Health Maintenance:  Immunization History   Administered Date(s) Administered    COVID-19, MRNA, LN-S, PF (MODERNA FULL 0.5 ML DOSE) 03/12/2021, 04/13/2021    Influenza - Quadrivalent - PF *Preferred* (6 months and older) 01/04/2024      Health Maintenance   Topic Date Due    Pneumococcal Vaccines (Age 50+) (1 of 2 - PCV) Never done    Shingles Vaccine (1 of 2) Never done    Cervical Cancer Screening  09/11/2016    Influenza Vaccine (1) 09/01/2024    COVID-19 Vaccine (3 - 2024-25 season) 09/01/2024    HIV Screening  06/02/2028 (Originally 12/25/1980)    Mammogram  06/17/2025    Colorectal Cancer Screening   "01/23/2027    TETANUS VACCINE  10/04/2027    Lipid Panel  01/22/2030    RSV Vaccine (Age 60+ and Pregnant patients) (1 - 1-dose 75+ series) 12/25/2040    Hepatitis C Screening  Completed        Physical Exam      Vital Signs  Temp: 97.7 °F (36.5 °C)  Temp Source: Oral  Pulse: 65  Resp: 17  SpO2: 99 %  BP: (!) 178/83 (states very anxious)  Pain Score:   4  Pain Loc: Abdomen  Height and Weight  Height: 5' 3" (160 cm)  Weight: 59 kg (130 lb)  BSA (Calculated - sq m): 1.62 sq meters  BMI (Calculated): 23  Weight in (lb) to have BMI = 25: 140.8]    Physical Exam  Vitals and nursing note reviewed.   Constitutional:       Appearance: Normal appearance. She is well-developed.   HENT:      Head: Normocephalic.      Right Ear: Hearing normal.      Left Ear: Hearing normal.      Nose: Nose normal.   Eyes:      General: Lids are normal.      Conjunctiva/sclera: Conjunctivae normal.   Cardiovascular:      Rate and Rhythm: Normal rate and regular rhythm.      Heart sounds: Normal heart sounds.   Pulmonary:      Effort: Pulmonary effort is normal.      Breath sounds: Normal breath sounds.   Musculoskeletal:         General: Normal range of motion.      Cervical back: Normal range of motion and neck supple.      Right lower leg: No edema.      Left lower leg: No edema.   Skin:     General: Skin is warm and dry.   Neurological:      Mental Status: She is alert and oriented to person, place, and time.      Gait: Gait is intact.   Psychiatric:         Mood and Affect: Mood is anxious. Affect is tearful.         Behavior: Behavior is cooperative.          Laboratory:  CBC:  Recent Labs   Lab 01/26/25  1848 01/27/25  0003 01/29/25  0512 01/29/25  1606 01/30/25  0529   WBC 9.87  --  4.36 L  --  4.28 L   RBC 2.75 L  --  2.36 L  --  2.44 L   Hemoglobin 9.5 L   < > 7.8 L   < > 8.2 L   Hematocrit 27.2 L   < > 23.4 L   < > 24.2 L   Platelet Count 229  --  195  --  246   MCV 98.9 H  --  99.2 H  --  99.2 H   MCH 34.5 H  --  33.1 H  --  33.6 H "   MCHC 34.9  --  33.3  --  33.9    < > = values in this interval not displayed.     CMP:  Recent Labs   Lab 01/04/24  1008 01/22/25  1012 01/26/25  1848 01/28/25  0620 01/30/25  0529   Glucose 88 82 108 H   < > 93   Calcium 9.7 9.6 9.7   < > 8.9   Albumin 4.1 4.4 3.7  --   --    Total Protein 7.6 7.6 6.6  --   --    Sodium 133 L 131 L 126 L   < > 131 L   Potassium 4.4 4.5 4.1   < > 4.7   CO2 26 19 L 19 L   < > 24   Chloride 103 99 94 L   < > 101   BUN 8 9 L 50 H   < > 6 L   Alk Phos 69 68 44  --   --    ALT 31 34 21  --   --    AST 28 55 H 27  --   --    Bilirubin, Total 0.3 0.4 0.4  --   --     < > = values in this interval not displayed.     LIPIDS:  Recent Labs   Lab 01/04/24  1008 07/08/24  0926 01/22/25  1012   HDL Cholesterol 112 H 133 H 148 H   Cholesterol 251 H 254 H 278 H   Triglycerides 111 66 57   LDL Calculated 117 108 119   Cholesterol/HDL Ratio (Risk Factor) 2.2 1.9 1.9   Non- 121 130     TSH:      A1C:  Recent Labs   Lab 01/27/25  0003   Hemoglobin A1C 4.8       Assessment/Plan     Rosetta Simental is a 59 y.o.female with:     1. Hospital discharge follow-up    2. Anxiety  -     busPIRone (BUSPAR) 15 MG tablet; Take 1 tablet (15 mg total) by mouth 3 (three) times daily.  Dispense: 90 tablet; Refill: 5    3. Acute blood loss anemia  -     CBC Auto Differential; Future; Expected date: 02/03/2025    4. Molina's esophagus without dysplasia    5. Gastric ulcer, unspecified chronicity, unspecified whether gastric ulcer hemorrhage or perforation present       Will increase her Buspar today   Hospital Discharge reviewed    Total time spent face-to-face and non-face-to-face coordinating care for this encounter was: 30 minutes     Chronic conditions status updated as per HPI.  Other than changes above, cont current medications and maintain follow up with specialists.  Return to clinic in 1 month(s).    Zee Corrigan, FNP  [facility]

## 2025-02-05 ENCOUNTER — TELEPHONE (OUTPATIENT)
Dept: FAMILY MEDICINE | Facility: CLINIC | Age: 60
End: 2025-02-05
Payer: OTHER GOVERNMENT

## 2025-02-05 NOTE — PROGRESS NOTES
RBC's and H &H are still low but have improved since her hospital stay- up to 10 and 31 from 8 and 24.

## 2025-02-05 NOTE — TELEPHONE ENCOUNTER
----- Message from VAISHALI Allison sent at 2/4/2025 10:18 PM CST -----  Needs to start on Statin medication

## 2025-02-05 NOTE — TELEPHONE ENCOUNTER
----- Message from VAISHALI Allison sent at 2/5/2025 10:00 AM CST -----  RBC's and H &H are still low but have improved since her hospital stay- up to 10 and 31 from 8 and 24.

## 2025-02-06 ENCOUNTER — TELEPHONE (OUTPATIENT)
Dept: FAMILY MEDICINE | Facility: CLINIC | Age: 60
End: 2025-02-06
Payer: OTHER GOVERNMENT

## 2025-02-06 DIAGNOSIS — E78.5 HYPERLIPIDEMIA, UNSPECIFIED HYPERLIPIDEMIA TYPE: Primary | ICD-10-CM

## 2025-02-06 RX ORDER — ATORVASTATIN CALCIUM 10 MG/1
10 TABLET, FILM COATED ORAL DAILY
Qty: 90 TABLET | Refills: 0 | Status: SHIPPED | OUTPATIENT
Start: 2025-02-06

## 2025-02-06 RX ORDER — ATORVASTATIN CALCIUM 10 MG/1
10 TABLET, FILM COATED ORAL DAILY
COMMUNITY
End: 2025-02-06 | Stop reason: SDUPTHER

## 2025-02-06 NOTE — TELEPHONE ENCOUNTER
Notified patient of results. Patient voiced understanding. Consented to starting cholesterol medicine. Atorvastatin sent to pt's pharmacy.

## 2025-02-14 ENCOUNTER — PATIENT MESSAGE (OUTPATIENT)
Dept: FAMILY MEDICINE | Facility: CLINIC | Age: 60
End: 2025-02-14
Payer: OTHER GOVERNMENT

## 2025-02-26 RX ORDER — PANTOPRAZOLE SODIUM 40 MG/1
40 TABLET, DELAYED RELEASE ORAL
Qty: 60 TABLET | Refills: 1 | Status: SHIPPED | OUTPATIENT
Start: 2025-02-26 | End: 2025-04-27

## 2025-02-26 NOTE — TELEPHONE ENCOUNTER
----- Message from Grace sent at 2/26/2025  9:36 AM CST -----  Regarding: Med Refill  Contact: Patient  Pt needs: pantoprazole (PROTONIX) 40 MG tabletPharmacy:  [facility]one #: [phone] (M)

## 2025-03-11 ENCOUNTER — OFFICE VISIT (OUTPATIENT)
Dept: GASTROENTEROLOGY | Facility: CLINIC | Age: 60
End: 2025-03-11
Payer: OTHER GOVERNMENT

## 2025-03-11 ENCOUNTER — RESULTS FOLLOW-UP (OUTPATIENT)
Dept: GASTROENTEROLOGY | Facility: CLINIC | Age: 60
End: 2025-03-11

## 2025-03-11 VITALS
HEART RATE: 82 BPM | WEIGHT: 131.63 LBS | SYSTOLIC BLOOD PRESSURE: 135 MMHG | OXYGEN SATURATION: 98 % | HEIGHT: 63 IN | BODY MASS INDEX: 23.32 KG/M2 | DIASTOLIC BLOOD PRESSURE: 80 MMHG

## 2025-03-11 DIAGNOSIS — K92.2 UGIB (UPPER GASTROINTESTINAL BLEED): Primary | ICD-10-CM

## 2025-03-11 DIAGNOSIS — K22.70 BARRETT'S ESOPHAGUS WITHOUT DYSPLASIA: ICD-10-CM

## 2025-03-11 DIAGNOSIS — E53.8 VITAMIN B 12 DEFICIENCY: ICD-10-CM

## 2025-03-11 DIAGNOSIS — K25.0 ACUTE GASTRIC ULCER WITH HEMORRHAGE: ICD-10-CM

## 2025-03-11 PROCEDURE — 99999 PR PBB SHADOW E&M-EST. PATIENT-LVL III: CPT | Mod: PBBFAC,,, | Performed by: NURSE PRACTITIONER

## 2025-03-11 PROCEDURE — 99213 OFFICE O/P EST LOW 20 MIN: CPT | Mod: PBBFAC | Performed by: NURSE PRACTITIONER

## 2025-03-11 PROCEDURE — 99214 OFFICE O/P EST MOD 30 MIN: CPT | Mod: S$PBB,,, | Performed by: NURSE PRACTITIONER

## 2025-03-11 NOTE — PROGRESS NOTES
Rosetta Simental is a 59 y.o. female here for Follow-up (HFU)        PCP: Zee Corrigan  Referring Provider: Lily Will, Marilyn  [address] [place] [address] [place] [id number]     HPI:  Presents in follow-up after discharge from the hospital.  Patient was admitted due to upper GI bleed secondary to gastric ulcers.  She had an EGD on 01/27/2025 that showed 3 ulcers in the stomach.  Ablation was performed.  No esophageal varices.  The patient also has Barretts esophagus.  Patient is currently taking Protonix 40 mg twice daily.  Reports that abdominal pain has significantly improved. Reports that she feels much better.  Also reports that reflux and heartburn have also improved.  Is not currently taking NSAIDs.  She does have a history of alcohol use.  No hematochezia or melena is reported.  States that she is taking oral iron but that stool has been what she considers a normal color.  Labs from 02/03/2025 reviewed, HGB 10.0 and HCT 31.  MCV elevated at 104, liver enzymes are normal.  Patient states that she has been following a primarily bland diet.  Advised that she may increase diet.  Recommend that she does avoid very spicy foods or red based sauce.  She did have a negative Cologuard on 01/23/2024.    Follow-up  Pertinent negatives include no abdominal pain, change in bowel habit, fatigue, fever, nausea or vomiting.         ROS:  Review of Systems   Constitutional:  Negative for appetite change, fatigue, fever and unexpected weight change.   HENT:  Negative for trouble swallowing.    Gastrointestinal:  Positive for reflux. Negative for abdominal pain, anal bleeding, blood in stool, change in bowel habit, constipation, diarrhea, nausea and vomiting.   Musculoskeletal:  Negative for gait problem.   Integumentary:  Negative for pallor.   Psychiatric/Behavioral:  The patient is not nervous/anxious.           PMHX:  has a past medical history of Alcohol dependence, Molina esophagus with  "esophagitis, Essential hypertension (04/27/2021), Peptic ulcer hemorrhage (01/27/2025), and Tobacco dependence.    PSHX:  has a past surgical history that includes Leg Surgery.    PFHX: family history includes Guillain-[place] syndrome in her mother; Heart attack in her father.    PSlHX:  reports that she has been smoking cigarettes. She has a 15 pack-year smoking history. She has been exposed to tobacco smoke. She has never used smokeless tobacco. She reports current alcohol use of about 4.0 standard drinks of alcohol per week. She reports that she does not use drugs.        Review of patient's allergies indicates:  No Known Allergies    Medication List with Changes/Refills   Current Medications    AMLODIPINE (NORVASC) 10 MG TABLET    Take 1 tablet (10 mg total) by mouth once daily.    ATORVASTATIN (LIPITOR) 10 MG TABLET    Take 1 tablet (10 mg total) by mouth once daily.    BUSPIRONE (BUSPAR) 15 MG TABLET    Take 1 tablet (15 mg total) by mouth 3 (three) times daily.    FERROUS SULFATE 325 (65 FE) MG EC TABLET    Take 1 tablet (325 mg total) by mouth once daily.    PANTOPRAZOLE (PROTONIX) 40 MG TABLET    Take 1 tablet (40 mg total) by mouth 2 (two) times daily before meals.    VALSARTAN (DIOVAN) 160 MG TABLET    Take 1 tablet (160 mg total) by mouth once daily.        Objective Findings:  Vital Signs:  /80   Pulse 82   Ht 5' 3" (1.6 m)   Wt 59.7 kg (131 lb 9.6 oz)   SpO2 98%   BMI 23.31 kg/m²  Body mass index is 23.31 kg/m².    Physical Exam:  Physical Exam  Vitals and nursing note reviewed.   Constitutional:       General: She is not in acute distress.     Appearance: Normal appearance.   HENT:      Mouth/Throat:      Mouth: Mucous membranes are moist.   Cardiovascular:      Rate and Rhythm: Normal rate.   Pulmonary:      Effort: Pulmonary effort is normal.      Breath sounds: Wheezing present. No rhonchi or rales.   Abdominal:      General: Bowel sounds are normal. There is no distension.      " "Palpations: Abdomen is soft. There is no mass.      Tenderness: There is no abdominal tenderness.   Skin:     General: Skin is warm and dry.      Coloration: Skin is not jaundiced or pale.   Neurological:      Mental Status: She is alert and oriented to person, place, and time.   Psychiatric:         Mood and Affect: Mood normal.          Labs:  Lab Results   Component Value Date    WBC 8.02 02/03/2025    HGB 10.0 (L) 02/03/2025    HCT 31.0 (L) 02/03/2025    .0 (H) 02/03/2025    RDW 13.6 02/03/2025     (H) 02/03/2025    LYMPH 17.3 (L) 02/03/2025    LYMPH 1.39 02/03/2025    MONO 16.2 (H) 02/03/2025    EOS 0.08 02/03/2025    BASO 0.04 02/03/2025     Lab Results   Component Value Date     (L) 01/30/2025    K 4.7 01/30/2025     01/30/2025    CO2 24 01/30/2025    GLU 93 01/30/2025    BUN 6 (L) 01/30/2025    CREATININE 0.66 01/30/2025    CALCIUM 8.9 01/30/2025    PROT 6.6 01/26/2025    ALBUMIN 3.7 01/26/2025    BILITOT 0.4 01/26/2025    ALKPHOS 44 01/26/2025    AST 27 01/26/2025    ALT 21 01/26/2025         Imaging: No results found.      Assessment:  Rosetta Simental is a 59 y.o. female here with:  1. UGIB (upper gastrointestinal bleed)    2. Molina's esophagus without dysplasia    3. Acute gastric ulcer with hemorrhage    4. Vitamin B 12 deficiency          Recommendations:  1. CBC, iron studies, B12 and folate  2. Continue Protonix  3. Avoid spicy, greasy foods  Avoid caffeine, citric acid, chocolate, peppermint, and carbonated drinks  Do not lay down within 3 hours of eating  Increase fluid to 64 ounces daily  Avoid antiinflammatory medications such as motrin, advil, aleve, ibuprofen, and BC powder    Portions of this note may have been created with voice recognition software.  Occasional wrong word or "sound a like substitutions may have occurred due to inherent limitations of voice recognition software.  Please read the note carefully and recognize using contexts, where substitutions " have occurred.    Diagnosis, risks, benefits, and side effects of any medications and treatment plan were discussed with the patient.  All questions were answered to the satisfaction of the patient, and patient verbalized understanding and agreement to the treatment plan.          Follow up in about 3 months (around 6/11/2025).      Order summary:  Orders Placed This Encounter    CBC Auto Differential    Iron and TIBC    Ferritin    Vitamin B12 & Folate       Thank you for allowing me to participate in the care of Rosetta Simental.      FREDA Busch

## 2025-04-14 ENCOUNTER — OFFICE VISIT (OUTPATIENT)
Dept: FAMILY MEDICINE | Facility: CLINIC | Age: 60
End: 2025-04-14
Payer: OTHER GOVERNMENT

## 2025-04-14 ENCOUNTER — RESULTS FOLLOW-UP (OUTPATIENT)
Dept: FAMILY MEDICINE | Facility: CLINIC | Age: 60
End: 2025-04-14

## 2025-04-14 VITALS
HEART RATE: 69 BPM | RESPIRATION RATE: 14 BRPM | DIASTOLIC BLOOD PRESSURE: 77 MMHG | HEIGHT: 63 IN | WEIGHT: 130 LBS | OXYGEN SATURATION: 98 % | TEMPERATURE: 99 F | SYSTOLIC BLOOD PRESSURE: 125 MMHG | BODY MASS INDEX: 23.04 KG/M2

## 2025-04-14 DIAGNOSIS — E78.5 HYPERLIPIDEMIA, UNSPECIFIED HYPERLIPIDEMIA TYPE: ICD-10-CM

## 2025-04-14 DIAGNOSIS — I10 ESSENTIAL HYPERTENSION: Primary | ICD-10-CM

## 2025-04-14 PROCEDURE — 99213 OFFICE O/P EST LOW 20 MIN: CPT | Mod: ,,, | Performed by: NURSE PRACTITIONER

## 2025-04-14 RX ORDER — PANTOPRAZOLE SODIUM 40 MG/1
40 TABLET, DELAYED RELEASE ORAL
Qty: 60 TABLET | Refills: 1 | Status: SHIPPED | OUTPATIENT
Start: 2025-04-14 | End: 2025-06-13

## 2025-04-14 RX ORDER — VALSARTAN 160 MG/1
160 TABLET ORAL DAILY
Qty: 90 TABLET | Refills: 1 | Status: SHIPPED | OUTPATIENT
Start: 2025-04-14

## 2025-04-14 RX ORDER — FERROUS SULFATE 325(65) MG
327 TABLET ORAL
COMMUNITY

## 2025-04-14 RX ORDER — ATORVASTATIN CALCIUM 10 MG/1
10 TABLET, FILM COATED ORAL DAILY
Qty: 90 TABLET | Refills: 0 | Status: SHIPPED | OUTPATIENT
Start: 2025-04-14

## 2025-04-14 RX ORDER — AMLODIPINE BESYLATE 10 MG/1
10 TABLET ORAL DAILY
Qty: 90 TABLET | Refills: 1 | Status: SHIPPED | OUTPATIENT
Start: 2025-04-14

## 2025-04-14 RX ORDER — LANOLIN ALCOHOL/MO/W.PET/CERES
1000 CREAM (GRAM) TOPICAL DAILY
COMMUNITY

## 2025-04-14 NOTE — PROGRESS NOTES
[facility] Medicine    Chief Complaint      Chief Complaint   Patient presents with    Follow-up       History of Present Illness      Rosetta Simental is a 59 y.o. female. She  has a past medical history of Alcohol dependence, Molina esophagus with esophagitis, Essential hypertension (04/27/2021), Peptic ulcer hemorrhage (01/27/2025), and Tobacco dependence., who presents today for f/u of HTN.  BP at goal today. Reviewed CMP- lab work improved.    Past Medical History:  Past Medical History:   Diagnosis Date    Alcohol dependence     Molina esophagus with esophagitis     Essential hypertension 04/27/2021    Peptic ulcer hemorrhage 01/27/2025    egd with argon plasma hemostasis    Tobacco dependence        Past Surgical History:   has a past surgical history that includes Leg Surgery.    Social History:  Social History[1]    I personally reviewed all past medical, surgical, and social.     Review of Systems   Constitutional:  Negative for fatigue.   HENT:  Negative for ear pain and sore throat.    Eyes:  Negative for pain, discharge and visual disturbance.   Respiratory:  Negative for cough and shortness of breath.    Cardiovascular: Negative.    Gastrointestinal:  Negative for abdominal pain, constipation and diarrhea.   Genitourinary:  Negative for dysuria, menstrual problem and vaginal discharge.   Musculoskeletal:  Negative for gait problem.   Skin: Negative.    Neurological:  Negative for dizziness and light-headedness.        Medications:  Encounter Medications[2]    Allergies:  Review of patient's allergies indicates:  No Known Allergies    Health Maintenance:  Immunization History   Administered Date(s) Administered    COVID-19, MRNA, LN-S, PF (MODERNA FULL 0.5 ML DOSE) 03/12/2021, 04/13/2021    Influenza - Quadrivalent - PF *Preferred* (6 months and older) 10/04/2017, 01/04/2024    Influenza - Trivalent - Afluria, Fluzone MDV 10/15/2015    Influenza - Trivalent - Fluad - Adjuvanted - PF (65 years and  "older 11/17/2016    Influenza Split 09/14/2009, 10/06/2011, 11/14/2012    Tdap 10/04/2017      Health Maintenance   Topic Date Due    Cervical Cancer Screening  Never done    Pneumococcal Vaccines (Age 50+) (1 of 2 - PCV) Never done    Shingles Vaccine (1 of 2) Never done    COVID-19 Vaccine (3 - 2024-25 season) 09/01/2024    Mammogram  06/17/2025    HIV Screening  06/02/2028 (Originally 12/25/1980)    Colorectal Cancer Screening  01/23/2027    TETANUS VACCINE  10/04/2027    Lipid Panel  01/22/2030    RSV Vaccine (Age 60+ and Pregnant patients) (1 - 1-dose 75+ series) 12/25/2040    Hepatitis C Screening  Completed    Influenza Vaccine  Addressed        Physical Exam      Vital Signs  Temp: 98.8 °F (37.1 °C)  Temp Source: Oral  Pulse: 69  Resp: 14  SpO2: 98 %  BP: 125/77  Pain Score: 0-No pain  Height and Weight  Height: 5' 3" (160 cm)  Weight: 59 kg (130 lb)  BSA (Calculated - sq m): 1.62 sq meters  BMI (Calculated): 23  Weight in (lb) to have BMI = 25: 140.8]    Physical Exam  Vitals and nursing note reviewed.   Constitutional:       Appearance: Normal appearance. She is well-developed.   HENT:      Head: Normocephalic.      Right Ear: Hearing normal.      Left Ear: Hearing normal.      Nose: Nose normal.   Eyes:      General: Lids are normal.      Extraocular Movements: Extraocular movements intact.      Conjunctiva/sclera: Conjunctivae normal.      Pupils: Pupils are equal, round, and reactive to light.   Cardiovascular:      Rate and Rhythm: Normal rate and regular rhythm.      Heart sounds: Normal heart sounds.   Pulmonary:      Effort: Pulmonary effort is normal.      Breath sounds: Normal breath sounds.   Musculoskeletal:         General: Normal range of motion.      Cervical back: Normal range of motion and neck supple.      Right lower leg: No edema.      Left lower leg: No edema.   Skin:     General: Skin is warm and dry.   Neurological:      Mental Status: She is alert and oriented to person, place, and " time.      Gait: Gait is intact.   Psychiatric:         Behavior: Behavior is cooperative.          Laboratory:  CBC:  Recent Labs   Lab 01/30/25  0529 02/03/25  1120 03/11/25  1123   WBC 4.28 L 8.02 9.24   RBC 2.44 L 2.98 L 3.96 L   Hemoglobin 8.2 L 10.0 L 12.4   Hematocrit 24.2 L 31.0 L 37.3 L   Platelet Count 246 608 H 508 H   MCV 99.2 H 104.0 H 94.2   MCH 33.6 H 33.6 H 31.3 H   MCHC 33.9 32.3 33.2     CMP:  Recent Labs   Lab 01/22/25  1012 01/26/25  1848 01/28/25  0620 04/11/25  0909   Glucose 82 108 H   < > 96   Calcium 9.6 9.7   < > 9.9   Albumin 4.4 3.7  --  4.1   Total Protein 7.6 6.6  --  7.4   Sodium 131 L 126 L   < > 133 L   Potassium 4.5 4.1   < > 4.6   CO2 19 L 19 L   < > 24   Chloride 99 94 L   < > 100   BUN 9 L 50 H   < > 10   Alk Phos 68 44  --  74   ALT 34 21  --  13   AST 55 H 27  --  21   Bilirubin, Total 0.4 0.4  --  0.4    < > = values in this interval not displayed.     LIPIDS:  Recent Labs   Lab 01/04/24  1008 07/08/24  0926 01/22/25  1012   HDL Cholesterol 112 H 133 H 148 H   Cholesterol 251 H 254 H 278 H   Triglycerides 111 66 57   LDL Calculated 117 108 119   Cholesterol/HDL Ratio (Risk Factor) 2.2 1.9 1.9   Non- 121 130     TSH:      A1C:  Recent Labs   Lab 01/27/25  0003   Hemoglobin A1C 4.8       Assessment/Plan     Rosetta Simental is a 59 y.o.female with:     1. Essential hypertension  -     amLODIPine (NORVASC) 10 MG tablet; Take 1 tablet (10 mg total) by mouth once daily.  Dispense: 90 tablet; Refill: 1  -     valsartan (DIOVAN) 160 MG tablet; Take 1 tablet (160 mg total) by mouth once daily.  Dispense: 90 tablet; Refill: 1    2. Hyperlipidemia, unspecified hyperlipidemia type  -     atorvastatin (LIPITOR) 10 MG tablet; Take 1 tablet (10 mg total) by mouth once daily.  Dispense: 90 tablet; Refill: 0    Other orders  -     pantoprazole (PROTONIX) 40 MG tablet; Take 1 tablet (40 mg total) by mouth 2 (two) times daily before meals.  Dispense: 60 tablet; Refill: 1       Will  get fasting labs at next appt      Total time spent face-to-face and non-face-to-face coordinating care for this encounter was: 20 minutes     Chronic conditions status updated as per HPI.  Other than changes above, cont current medications and maintain follow up with specialists.  Return to clinic in 6 month(s).    SUNG RamirezP  [facility]         [1]   Social History  Tobacco Use    Smoking status: Every Day     Current packs/day: 0.75     Average packs/day: 0.8 packs/day for 20.0 years (15.0 ttl pk-yrs)     Types: Cigarettes     Passive exposure: Current    Smokeless tobacco: Never   Substance Use Topics    Alcohol use: Yes     Alcohol/week: 4.0 standard drinks of alcohol     Types: 4 Standard drinks or equivalent per week    Drug use: Never   [2]   Outpatient Encounter Medications as of 4/14/2025   Medication Sig Dispense Refill    busPIRone (BUSPAR) 15 MG tablet Take 1 tablet (15 mg total) by mouth 3 (three) times daily. 90 tablet 5    cyanocobalamin (VITAMIN B-12) 1000 MCG tablet Take 1,000 mcg by mouth once daily.      ferrous sulfate (FEOSOL) 325 mg (65 mg iron) Tab tablet Take 327 mg by mouth daily with breakfast.      [DISCONTINUED] amLODIPine (NORVASC) 10 MG tablet Take 1 tablet (10 mg total) by mouth once daily. 90 tablet 1    [DISCONTINUED] atorvastatin (LIPITOR) 10 MG tablet Take 1 tablet (10 mg total) by mouth once daily. 90 tablet 0    [DISCONTINUED] pantoprazole (PROTONIX) 40 MG tablet Take 1 tablet (40 mg total) by mouth 2 (two) times daily before meals. 60 tablet 1    [DISCONTINUED] valsartan (DIOVAN) 160 MG tablet Take 1 tablet (160 mg total) by mouth once daily. 90 tablet 1    amLODIPine (NORVASC) 10 MG tablet Take 1 tablet (10 mg total) by mouth once daily. 90 tablet 1    atorvastatin (LIPITOR) 10 MG tablet Take 1 tablet (10 mg total) by mouth once daily. 90 tablet 0    pantoprazole (PROTONIX) 40 MG tablet Take 1 tablet (40 mg total) by mouth 2 (two) times daily before meals. 60  tablet 1    valsartan (DIOVAN) 160 MG tablet Take 1 tablet (160 mg total) by mouth once daily. 90 tablet 1     No facility-administered encounter medications on file as of 4/14/2025.

## 2025-06-11 ENCOUNTER — RESULTS FOLLOW-UP (OUTPATIENT)
Dept: GASTROENTEROLOGY | Facility: CLINIC | Age: 60
End: 2025-06-11

## 2025-06-11 ENCOUNTER — TELEPHONE (OUTPATIENT)
Dept: GASTROENTEROLOGY | Facility: CLINIC | Age: 60
End: 2025-06-11
Payer: OTHER GOVERNMENT

## 2025-06-11 ENCOUNTER — OFFICE VISIT (OUTPATIENT)
Dept: GASTROENTEROLOGY | Facility: CLINIC | Age: 60
End: 2025-06-11
Payer: OTHER GOVERNMENT

## 2025-06-11 VITALS
HEIGHT: 63 IN | HEART RATE: 71 BPM | OXYGEN SATURATION: 95 % | SYSTOLIC BLOOD PRESSURE: 138 MMHG | WEIGHT: 139.81 LBS | BODY MASS INDEX: 24.77 KG/M2 | DIASTOLIC BLOOD PRESSURE: 75 MMHG

## 2025-06-11 DIAGNOSIS — K22.70 BARRETT'S ESOPHAGUS WITHOUT DYSPLASIA: Primary | ICD-10-CM

## 2025-06-11 DIAGNOSIS — D50.9 IRON DEFICIENCY ANEMIA, UNSPECIFIED IRON DEFICIENCY ANEMIA TYPE: ICD-10-CM

## 2025-06-11 DIAGNOSIS — K25.3 ACUTE GASTRIC ULCER, UNSPECIFIED WHETHER GASTRIC ULCER HEMORRHAGE OR PERFORATION PRESENT: ICD-10-CM

## 2025-06-11 DIAGNOSIS — K29.00 ACUTE SUPERFICIAL GASTRITIS WITHOUT HEMORRHAGE: ICD-10-CM

## 2025-06-11 PROCEDURE — 99999 PR PBB SHADOW E&M-EST. PATIENT-LVL IV: CPT | Mod: PBBFAC,,, | Performed by: NURSE PRACTITIONER

## 2025-06-11 PROCEDURE — 99214 OFFICE O/P EST MOD 30 MIN: CPT | Mod: PBBFAC | Performed by: NURSE PRACTITIONER

## 2025-06-11 PROCEDURE — 99214 OFFICE O/P EST MOD 30 MIN: CPT | Mod: S$PBB,,, | Performed by: NURSE PRACTITIONER

## 2025-06-11 RX ORDER — PANTOPRAZOLE SODIUM 40 MG/1
40 TABLET, DELAYED RELEASE ORAL DAILY
Qty: 30 TABLET | Refills: 1 | Status: SHIPPED | OUTPATIENT
Start: 2025-06-11 | End: 2025-08-10

## 2025-06-11 NOTE — PROGRESS NOTES
Rosetta Simental is a 59 y.o. female here for Follow-up        PCP: Zee Corrigan  Referring Provider: No referring provider defined for this encounter.     HPI:  Presents for follow-up due to iron-deficiency anemia.  The patient had an upper GI bleed related to gastric ulcers in January.  Patient had EGD on 01/27/2025, report reviewed, The stomach had gastritis and 2 ulcers were present in the antrum.  One ulcer had a visible vessel and it was cauterized with APC.  Patient is currently taking oral iron which alters color of stool.  Does not take NSAIDs.  Reports that she is no longer drinking alcohol.  Continues to take Protonix.  States that Protonix has improved her symptoms of reflux, heartburn, and vomiting.  States that she is overall feeling much better.  She is currently taking over-the-counter B12.  B12 level on 03/11 was 475.  Iron 29, saturation 12, ferritin 333.  Liver enzymes were normal.  We will repeat iron levels today.  Patient did have a negative Cologuard on 01/23/2024.  Patient reports some lower extremity edema related to excessive walking on a recent trip to [place].  States that she was walking 12 miles per day.    Follow-up  Pertinent negatives include no abdominal pain, change in bowel habit, fatigue, fever, nausea or vomiting.         ROS:  Review of Systems   Constitutional:  Negative for appetite change, fatigue, fever and unexpected weight change.   HENT:  Negative for trouble swallowing.    Cardiovascular:  Positive for leg swelling.   Gastrointestinal:  Positive for reflux. Negative for abdominal pain, blood in stool, change in bowel habit, constipation, diarrhea, nausea and vomiting.   Musculoskeletal:  Negative for gait problem.   Integumentary:  Negative for pallor.   Psychiatric/Behavioral:  The patient is not nervous/anxious.           PMHX:  has a past medical history of Alcohol dependence, Molina esophagus with esophagitis, Essential hypertension (04/27/2021),  "Peptic ulcer hemorrhage (01/27/2025), and Tobacco dependence.    PSHX:  has a past surgical history that includes Leg Surgery.    PFHX: family history includes Guillain-[place] syndrome in her mother; Heart attack in her father.    PSlHX:  reports that she has been smoking cigarettes. She has a 15 pack-year smoking history. She has been exposed to tobacco smoke. She has never used smokeless tobacco. She reports current alcohol use of about 4.0 standard drinks of alcohol per week. She reports that she does not use drugs.        Review of patient's allergies indicates:  No Known Allergies    Medication List with Changes/Refills   Current Medications    AMLODIPINE (NORVASC) 10 MG TABLET    Take 1 tablet (10 mg total) by mouth once daily.    ATORVASTATIN (LIPITOR) 10 MG TABLET    Take 1 tablet (10 mg total) by mouth once daily.    BUSPIRONE (BUSPAR) 15 MG TABLET    Take 1 tablet (15 mg total) by mouth 3 (three) times daily.    CYANOCOBALAMIN (VITAMIN B-12) 1000 MCG TABLET    Take 1,000 mcg by mouth once daily.    FERROUS SULFATE (FEOSOL) 325 MG (65 MG IRON) TAB TABLET    Take 327 mg by mouth daily with breakfast.    VALSARTAN (DIOVAN) 160 MG TABLET    Take 1 tablet (160 mg total) by mouth once daily.   Changed and/or Refilled Medications    Modified Medication Previous Medication    PANTOPRAZOLE (PROTONIX) 40 MG TABLET pantoprazole (PROTONIX) 40 MG tablet       Take 1 tablet (40 mg total) by mouth once daily.    Take 1 tablet (40 mg total) by mouth 2 (two) times daily before meals.        Objective Findings:  Vital Signs:  /75   Pulse 71   Ht 5' 3" (1.6 m)   Wt 63.4 kg (139 lb 12.8 oz)   SpO2 95%   BMI 24.76 kg/m²  Body mass index is 24.76 kg/m².    Physical Exam:  Physical Exam  Vitals and nursing note reviewed.   Constitutional:       General: She is not in acute distress.     Appearance: Normal appearance.   HENT:      Mouth/Throat:      Mouth: Mucous membranes are moist.   Cardiovascular:      Rate and " Rhythm: Normal rate.   Pulmonary:      Effort: Pulmonary effort is normal.   Abdominal:      General: Bowel sounds are normal. There is no distension.      Palpations: Abdomen is soft. There is no mass.      Tenderness: There is no abdominal tenderness.   Musculoskeletal:      Right lower leg: Edema present.      Left lower leg: Edema present.   Skin:     General: Skin is warm and dry.      Coloration: Skin is not jaundiced or pale.   Neurological:      Mental Status: She is alert and oriented to person, place, and time.   Psychiatric:         Mood and Affect: Mood normal.          Labs:  Lab Results   Component Value Date    WBC 9.24 03/11/2025    HGB 12.4 03/11/2025    HCT 37.3 (L) 03/11/2025    MCV 94.2 03/11/2025    RDW 13.1 03/11/2025     (H) 03/11/2025    LYMPH 23.6 (L) 03/11/2025    LYMPH 2.18 03/11/2025    MONO 10.8 (H) 03/11/2025    EOS 0.17 03/11/2025    BASO 0.07 03/11/2025     Lab Results   Component Value Date     (L) 04/11/2025    K 4.6 04/11/2025     04/11/2025    CO2 24 04/11/2025    GLU 96 04/11/2025    BUN 10 04/11/2025    CREATININE 0.71 04/11/2025    CALCIUM 9.9 04/11/2025    PROT 7.4 04/11/2025    ALBUMIN 4.1 04/11/2025    BILITOT 0.4 04/11/2025    ALKPHOS 74 04/11/2025    AST 21 04/11/2025    ALT 13 04/11/2025         Imaging: No results found.      Assessment:  Rosetta Simental is a 59 y.o. female here with:  1. Molina's esophagus without dysplasia    2. Acute superficial gastritis without hemorrhage    3. Iron deficiency anemia, unspecified iron deficiency anemia type    4. Acute gastric ulcer, unspecified whether gastric ulcer hemorrhage or perforation present          Recommendations:  1. Reduce Protonix 40 mg daily  2. Avoid spicy, greasy foods  Avoid caffeine, citric acid, chocolate, peppermint, and carbonated drinks  Do not lay down within 3 hours of eating  Increase fluid to 64 ounces daily  Avoid antiinflammatory medications such as motrin, advil, aleve,  "ibuprofen, and BC powder  3. CBC, Iron studies  4. Continue oral iron and B 12 supplement  5. If she is no longer iron-deficient we will consider stopping oral iron    Portions of this note may have been created with voice recognition software.  Occasional wrong word or "sound a like substitutions may have occurred due to inherent limitations of voice recognition software.  Please read the note carefully and recognize using contexts, where substitutions have occurred.    Diagnosis, risks, benefits, and side effects of any medications and treatment plan were discussed with the patient.  All questions were answered to the satisfaction of the patient, and patient verbalized understanding and agreement to the treatment plan.          Follow up in about 6 months (around 12/11/2025).      Order summary:  Orders Placed This Encounter    CBC Auto Differential    Iron and TIBC    Ferritin    pantoprazole (PROTONIX) 40 MG tablet       Thank you for allowing me to participate in the care of Rosetta Simental.      Lily Will, SUNGP-C          "

## 2025-06-11 NOTE — TELEPHONE ENCOUNTER
Results and recommendations called to patient. Verbalized understanding.          ----- Message from VAISHALI Dangelo sent at 6/11/2025 12:42 PM CDT -----  No anemia. Iron studies have improved. Patient may stop oral iron. Will recheck iron levels at follow up appointment.  ----- Message -----  From: Lab, Background User  Sent: 6/11/2025  11:50 AM CDT  To: VAISHALI Rico

## 2025-06-23 ENCOUNTER — HOSPITAL ENCOUNTER (OUTPATIENT)
Dept: RADIOLOGY | Facility: HOSPITAL | Age: 60
Discharge: HOME | End: 2025-06-23
Attending: NURSE PRACTITIONER
Payer: COMMERCIAL

## 2025-06-23 VITALS — HEIGHT: 63 IN | WEIGHT: 130 LBS | BODY MASS INDEX: 23.04 KG/M2

## 2025-06-23 DIAGNOSIS — Z12.31 VISIT FOR SCREENING MAMMOGRAM: ICD-10-CM

## 2025-06-23 PROCEDURE — 77063 BREAST TOMOSYNTHESIS BI: CPT | Mod: 26,,, | Performed by: RADIOLOGY

## 2025-06-23 PROCEDURE — 77067 SCR MAMMO BI INCL CAD: CPT | Mod: 26,,, | Performed by: RADIOLOGY

## 2025-06-23 PROCEDURE — 77067 SCR MAMMO BI INCL CAD: CPT | Mod: TC

## 2025-07-31 DIAGNOSIS — F41.9 ANXIETY: ICD-10-CM

## 2025-07-31 RX ORDER — BUSPIRONE HYDROCHLORIDE 15 MG/1
TABLET ORAL
Qty: 90 TABLET | Refills: 5 | Status: SHIPPED | OUTPATIENT
Start: 2025-07-31